# Patient Record
Sex: FEMALE | Race: WHITE
[De-identification: names, ages, dates, MRNs, and addresses within clinical notes are randomized per-mention and may not be internally consistent; named-entity substitution may affect disease eponyms.]

---

## 2017-01-11 ENCOUNTER — HOSPITAL ENCOUNTER (OUTPATIENT)
Dept: HOSPITAL 47 - RADXRMAIN | Age: 82
Discharge: HOME | End: 2017-01-11
Payer: MEDICARE

## 2017-01-11 DIAGNOSIS — R19.09: Primary | ICD-10-CM

## 2017-01-11 PROCEDURE — 74000: CPT

## 2017-01-11 NOTE — XR
EXAMINATION TYPE: XR KUB

 

DATE OF EXAM: 1/11/2017 4:03 PM

 

COMPARISON: NONE

 

HISTORY: 81 year-old female history of stones and right flank pain

 

FINDINGS: 

Lung bases are clear.

 

Supine imaging limited for assessment of free air.

 

Overall nonobstructive bowel gas pattern. There is mild stool burden.

 

There is a subtle 5 mm calcific density in the right mid abdomen.

 

Posterior lumbar fusion hardware and lateral osseous fusion changes are present.

 

 

IMPRESSION: 

1. Subtle 5 mm density projecting in the right mid abdomen, suspected right renal calculus.

2. Nonobstructive bowel gas pattern.

## 2017-02-10 ENCOUNTER — HOSPITAL ENCOUNTER (OUTPATIENT)
Dept: HOSPITAL 47 - RADXRMAIN | Age: 82
Discharge: HOME | End: 2017-02-10
Payer: MEDICARE

## 2017-02-10 DIAGNOSIS — N28.89: Primary | ICD-10-CM

## 2017-02-10 PROCEDURE — 74000: CPT

## 2017-02-10 NOTE — XR
EXAMINATION TYPE: XR abdomen 1V

 

DATE OF EXAM: 2/10/2017 11:35 AM

 

COMPARISON: Renal calculus

 

INDICATION: Post lithotripsy

 

TECHNIQUE: Single view abdomen supine

 

FINDINGS:  

There is a normal bowel gas pattern.

Psoas margins are normal.

No organomegaly is present.

Very faint calcifications may be in the upper midportion of the right kidney. This may be stable from
 the comparison

 

IMPRESSION: 

1. Superior mid right renal calcification, stable

## 2017-02-20 ENCOUNTER — HOSPITAL ENCOUNTER (OUTPATIENT)
Dept: HOSPITAL 47 - RADUSWWP | Age: 82
Discharge: HOME | End: 2017-02-20
Payer: MEDICARE

## 2017-02-20 DIAGNOSIS — N20.0: Primary | ICD-10-CM

## 2017-02-20 DIAGNOSIS — K74.60: ICD-10-CM

## 2017-02-20 DIAGNOSIS — K82.8: ICD-10-CM

## 2017-02-20 PROCEDURE — 76700 US EXAM ABDOM COMPLETE: CPT

## 2017-02-20 NOTE — US
EXAMINATION TYPE: US abdomen complete

 

DATE OF EXAM: 2/20/2017 1:19 PM

 

COMPARISON: Correlation CT 1/23/2015.

 

CLINICAL HISTORY: 81-year-old female R16.1 SPLENOMEGALY; prior renal stones removed.

 

TECHNIQUE: Multiple sonographic images of the abdomen were obtained.

 

 

FINDINGS:

Liver Length:  11.3 cm   

Gallbladder Wall:  0.1 cm   

CBD:  0.3 cm

Spleen:  10.7 cm   

Right Kidney:  9.7 x 5.4 x 5.0 cm 

Left Kidney:  10.5 x 4.4 x 4.0  cm   

 

Pancreas:  No gross abnormality.

Liver: Diffusely coarsened echotexture and nodular contour. No focal lesion seen.

Gallbladder: Gallbladder is mildly hydropic with a small amount of gallbladder sludge. No gallbladder
 wall thickening, shadowing calculi or pericholecystic fluid.

**Evidence for sonographic Goff's sign:  No

CBD:  Within normal limits. 

Spleen:  Normal size.   

Right Kidney: No hydronephrosis. Scattered echogenic foci are present, largest in the midpole measuri
ng 6 mm.

Left Kidney: No hydronephrosis. A couple echogenic foci are present, largest in the upper pole measur
es 2 mm.

Upper IVC: Within normal limits.

Abd Aorta:  Upper abdominal aorta is borderline ectatic at 2.4 cm.

 

 

 

 

IMPRESSION: 

1. Cirrhotic morphology of the liver.

2. Mildly hydropic gallbladder probably relating to fasting state. No ancillary findings of acute cho
lecystitis.

3. Bilateral nephrolithiasis measuring up to 6 mm on the right and 2 mm on the left. No hydronephrosi
s.

4. The spleen is measured at normal size (10.7 cm).

## 2017-06-20 ENCOUNTER — HOSPITAL ENCOUNTER (INPATIENT)
Dept: HOSPITAL 47 - 2ORMAIN | Age: 82
LOS: 3 days | Discharge: NURSING, REHAB OR LONG-TERM CARE | DRG: 470 | End: 2017-06-23
Payer: MEDICARE

## 2017-06-20 VITALS — BODY MASS INDEX: 31.7 KG/M2

## 2017-06-20 DIAGNOSIS — E11.65: ICD-10-CM

## 2017-06-20 DIAGNOSIS — K21.9: ICD-10-CM

## 2017-06-20 DIAGNOSIS — M17.11: Primary | ICD-10-CM

## 2017-06-20 DIAGNOSIS — Z80.0: ICD-10-CM

## 2017-06-20 DIAGNOSIS — D64.9: ICD-10-CM

## 2017-06-20 DIAGNOSIS — R00.1: ICD-10-CM

## 2017-06-20 DIAGNOSIS — G25.81: ICD-10-CM

## 2017-06-20 DIAGNOSIS — Z87.442: ICD-10-CM

## 2017-06-20 DIAGNOSIS — E03.9: ICD-10-CM

## 2017-06-20 DIAGNOSIS — I10: ICD-10-CM

## 2017-06-20 DIAGNOSIS — D69.6: ICD-10-CM

## 2017-06-20 LAB
GLUCOSE BLD-MCNC: 136 MG/DL (ref 75–99)
GLUCOSE BLD-MCNC: 138 MG/DL (ref 75–99)
GLUCOSE BLD-MCNC: 90 MG/DL (ref 75–99)

## 2017-06-20 PROCEDURE — 0SRC0J9 REPLACEMENT OF RIGHT KNEE JOINT WITH SYNTHETIC SUBSTITUTE, CEMENTED, OPEN APPROACH: ICD-10-PCS

## 2017-06-20 PROCEDURE — 80048 BASIC METABOLIC PNL TOTAL CA: CPT

## 2017-06-20 PROCEDURE — 85025 COMPLETE CBC W/AUTO DIFF WBC: CPT

## 2017-06-20 PROCEDURE — 88300 SURGICAL PATH GROSS: CPT

## 2017-06-20 RX ADMIN — HYDROCODONE BITARTRATE AND ACETAMINOPHEN PRN EACH: 5; 325 TABLET ORAL at 20:47

## 2017-06-20 RX ADMIN — HYDROMORPHONE HYDROCHLORIDE PRN MG: 1 INJECTION, SOLUTION INTRAMUSCULAR; INTRAVENOUS; SUBCUTANEOUS at 13:50

## 2017-06-20 RX ADMIN — HYDROMORPHONE HYDROCHLORIDE PRN MG: 1 INJECTION, SOLUTION INTRAMUSCULAR; INTRAVENOUS; SUBCUTANEOUS at 13:43

## 2017-06-20 RX ADMIN — POTASSIUM CHLORIDE SCH MLS: 14.9 INJECTION, SOLUTION INTRAVENOUS at 10:09

## 2017-06-20 RX ADMIN — DOCUSATE SODIUM AND SENNOSIDES SCH EACH: 50; 8.6 TABLET ORAL at 21:44

## 2017-06-20 RX ADMIN — HYDROMORPHONE HYDROCHLORIDE PRN MG: 1 INJECTION, SOLUTION INTRAMUSCULAR; INTRAVENOUS; SUBCUTANEOUS at 13:35

## 2017-06-20 RX ADMIN — ASPIRIN 325 MG ORAL TABLET SCH MG: 325 PILL ORAL at 21:44

## 2017-06-20 RX ADMIN — CEFAZOLIN SCH MLS/HR: 330 INJECTION, POWDER, FOR SOLUTION INTRAMUSCULAR; INTRAVENOUS at 15:24

## 2017-06-20 NOTE — P.ONQ
Anesthesiology Proc Note - PNB





- Peripheral Nerve Block Performed


  ** Right Adductor Canal


Time Out Performed: Yes


Procedure Start Time: 10:40


Indication: Acute Post-Operative Pain


Sedation Type: Sedate with meaningful contact maintained


Preparation: Sterile Prep


Position: Supine


Catheter: Indwelling


Needle Types: On-Q


Needle Size: 100mm (4")


Needle Gauge: 20


Technique: Ultrasound


Injectate: 0.5% Ropivacaine (see comment for volume)


Blood Aspirated: No


Pain Paresthesia on Injection Noted: No


Resistance on Injection: Normal


Events: Uneventful and Well Tolerated

## 2017-06-20 NOTE — P.OP
Date of Procedure: 06/20/17


Preoperative Diagnosis: 


Severe osteoarthritis right knee


Postoperative Diagnosis: 


Severe osteoarthritis right knee


Procedure(s) Performed: 


Right total knee arthroplasty


Implants: 


Smith and Nephew Oxinium femoral component size 5, right


Smith & Nephew Griselda II right nonporous tibial baseplate size 4


Smith & Nephew size 9 mm Legion XLPE dished articular insert, size 3-4


Smith & Nephew Griselda II resurfacing patellar component, 32 mm


All components were cemented using Mena bone cement..


The articulation is ceramic on polyethylene.


Anesthesia: GETA


Surgeon: Isai Thomas


Assistant #1: Daniella Mcgraw


Estimated Blood Loss (ml): 50


Pathology: other (Bone and cartilage)


Condition: stable


Disposition: PACU


Indications for Procedure: 


After failure of conservative treatment we discussed the surgical and 

nonsurgical treatment options at length.  Patient wishes to proceed with a 

total knee arthroplasty.  Complications specific to this procedure were 

discussed at length, including but not limited to infection, bleeding, stiffness

, and nerve injury.  Patient is aware of all these complications and informed 

consent was obtained


Operative Findings: 


The operative findings are consistent with severe osteoarthritis of the right 

knee


Description of Procedure: 


Patient was seen in the preoperative area consent was reviewed and operative 

site was marked with a skin marker.  An adductor canal pain catheter was placed 

by anesthesia in the preoperative area.  Patient was then brought to the 

operating room and given preoperative antibiotics intravenously. A general 

anesthetic was administered by the anesthesia department.  A tourniquet was 

placed on the upper thigh and the lower extremity was prepped and draped in 

usual sterile fashion.  A gram of transexamic acid was given.  A universal 

timeout was then performed which confirmed the patient's name, surgical site, 

ALLERGIES, and consent.





The lower extremity was then exsanguinated and tourniquet was inflated to 250 

mmHg.  A standard and anterior midline approach to the knee was performed.  The 

skin and subcutaneous tissue was dissected down to the patellar tendon.  A 

medial parapatellar arthrotomy was then performed.  The knee was then extended, 

the patellar was everted, and the knee was again flexed.  Anterior horns of 

both menisci were excised, and a release was performed to the posterior medial 

aspect of the knee.  On gross visual inspection, there was complete loss of 

articular cartilage in the medial and patellofemoral joint spaces.  There was 

also significant cartilage damage in the lateral compartment.  There were 

multiple periarticular osteophytes which were then removed with a Ronguer.  The 

femoral canal was then opened with the appropriate drill, and the 

intramedullary femoral cutting guide was then placed and set for 4 of valgus.  

The distal femoral cutting block was then pinned in place, and the distal femur 

was then cut.  The cutting block was then removed and the cut was checked for 

flatness.  Next, the sizing guide was then placed and set for 3 external 

rotation based off of the epicondylar axis and Whitesides line.  After the 

femur was sized, the appropriate 4-in-1 cutting block was then pinned in place.

  The anterior condyles were cut without notching.  The posterior and chamfer 

cuts were performed while protecting the collateral ligaments.  The cutting 

block was then removed, and the femoral canal was plugged with autologous bone.





Attention was then directed to the tibia.  The remaining ACL was removed with a 

Ronguer, and the tibia was then gently subluxed forward with a large bent knee 

retractor.  Any remaining menisci was excised.  The posterior lateral corner 

was cauterized in order to cauterize the lateral geniculate artery.  The extra 

medullary tibial cutting guide was then placed, set for the appropriate rotation

, slope, and depth of resection.  The proximal tibia cutting guide was then 

pinned in place.  Proximal tibia was then cut and sized.  Next trials were then 

placed with the appropriate-sized insert.  The knee was able to fully extend 

and flex to 130 and was stable throughout all range of motion.  The knee was 

then extended, patella everted.  Patella was then measured, and then using an 

osteotomy guide, the patella was cut at the appropriate level.  The patella was 

then measured and drilled and the patella trial was then placed.  The knee was 

then taken through range of motion with the patella trial and the patella 

tracked normally.  The knee was then extended patella trial was then removed 

and the patella was everted.  Knee was then flexed and lug holes were drilled 

through the femoral trial and the femoral trial was then removed.  The tibial 

was then exposed, and the tibial broach guide was then pinned in place after it 

was set for the appropriate rotation to allow for the most coverage without 

overhang.  The tibia was then reamed and broached.





The cut surfaces of bone were then irrigated with pulsatile lavage.  The 

posterior structures were injected with the ropivacaine solution. The 

components were then opened, the cement was mixed, and the components were then 

cemented in place.  The cement was allowed to harden with the knee in full 

extension.  While the cement was hardening, the remaining soft tissues were 

then injected with a ropivacaine solution, which consisted of 246.25 mg of 

ropivacaine, 0.5 mg of epinephrine, 30 mg of Toradol, 80 g of clonidine, and 

48.45 mL of sterile water, for a total of 100 mL of fluid injected. After the 

cemented hardened.  The tourniquet was released, and hemostasis was obtained.  

A second gram of transexamic acid was given.  The knee was again irrigated.  

The knee was again taken through range of motion and found to be stable 

throughout all range of motion of 0-130, and the patella tracked normally.  

The fascia was then closed with #2 strata fix suture.  The subcutaneous tissue 

was closed with 3-0 Vicryl and 3-0 strata fix.  Dermabond tape was used for the 

skin and placed with the knee in flexion. The patient was placed in a sterile 

dressing.  Patient was then transferred to recovery room in stable condition.





The assistant JARRELL Greenwood was required due the complexity surgery and the 

need for a skilled surgical assistant.  She assisted in positioning, draping, 

retraction, and closure of the wound.

## 2017-06-20 NOTE — XR
EXAMINATION TYPE: XR knee limited RT

 

DATE OF EXAM: 6/20/2017

 

CLINICAL HISTORY: Right knee pain and arthritis status post total knee replacement.

 

TECHNIQUE:  Portable AP and crosstable lateral views of the right knee are obtained immediately posto
peratively.

 

COMPARISON: None

 

FINDINGS:  Metallic hardware from total right knee arthroplasty is seen and appears satisfactory in a
lignment and position.  There is evidence of recent surgery.

 

 IMPRESSION:  METALLIC HARDWARE FROM TOTAL right KNEE ARTHROPLASTY IS SATISFACTORY IN ALIGNMENT.

## 2017-06-21 VITALS — RESPIRATION RATE: 16 BRPM

## 2017-06-21 LAB
BASOPHILS # BLD AUTO: 0 K/UL (ref 0–0.2)
BASOPHILS NFR BLD AUTO: 0 %
CH: 29.9
CHCM: 32.2
EOSINOPHIL # BLD AUTO: 0 K/UL (ref 0–0.7)
EOSINOPHIL NFR BLD AUTO: 0 %
ERYTHROCYTE [DISTWIDTH] IN BLOOD BY AUTOMATED COUNT: 3.8 M/UL (ref 3.8–5.4)
ERYTHROCYTE [DISTWIDTH] IN BLOOD: 13.9 % (ref 11.5–15.5)
GLUCOSE BLD-MCNC: 104 MG/DL (ref 75–99)
GLUCOSE BLD-MCNC: 68 MG/DL (ref 75–99)
GLUCOSE BLD-MCNC: 82 MG/DL (ref 75–99)
GLUCOSE BLD-MCNC: 84 MG/DL (ref 75–99)
GLUCOSE BLD-MCNC: 88 MG/DL (ref 75–99)
HCT VFR BLD AUTO: 35.4 % (ref 34–46)
HDW: 2.45
HGB BLD-MCNC: 11.3 GM/DL (ref 11.4–16)
LUC NFR BLD AUTO: 2 %
LYMPHOCYTES # SPEC AUTO: 1 K/UL (ref 1–4.8)
LYMPHOCYTES NFR SPEC AUTO: 19 %
MCH RBC QN AUTO: 29.7 PG (ref 25–35)
MCHC RBC AUTO-ENTMCNC: 31.9 G/DL (ref 31–37)
MCV RBC AUTO: 93.3 FL (ref 80–100)
MONOCYTES # BLD AUTO: 0.4 K/UL (ref 0–1)
MONOCYTES NFR BLD AUTO: 7 %
NEUTROPHILS # BLD AUTO: 3.8 K/UL (ref 1.3–7.7)
NEUTROPHILS NFR BLD AUTO: 72 %
WBC # BLD AUTO: 0.09 10*3/UL
WBC # BLD AUTO: 5.3 K/UL (ref 3.8–10.6)
WBC (PEROX): 5.45

## 2017-06-21 RX ADMIN — ASPIRIN 325 MG ORAL TABLET SCH MG: 325 PILL ORAL at 07:42

## 2017-06-21 RX ADMIN — COLCHICINE SCH: 0.6 TABLET, FILM COATED ORAL at 07:42

## 2017-06-21 RX ADMIN — HYDROCODONE BITARTRATE AND ACETAMINOPHEN PRN EACH: 5; 325 TABLET ORAL at 14:17

## 2017-06-21 RX ADMIN — HYDROCODONE BITARTRATE AND ACETAMINOPHEN PRN EACH: 5; 325 TABLET ORAL at 21:07

## 2017-06-21 RX ADMIN — MELOXICAM SCH MG: 7.5 TABLET ORAL at 07:42

## 2017-06-21 RX ADMIN — POTASSIUM CHLORIDE SCH: 14.9 INJECTION, SOLUTION INTRAVENOUS at 04:49

## 2017-06-21 RX ADMIN — CEFAZOLIN SCH MLS/HR: 330 INJECTION, POWDER, FOR SOLUTION INTRAMUSCULAR; INTRAVENOUS at 04:58

## 2017-06-21 RX ADMIN — HYDROCODONE BITARTRATE AND ACETAMINOPHEN PRN EACH: 5; 325 TABLET ORAL at 09:10

## 2017-06-21 RX ADMIN — PANTOPRAZOLE SODIUM SCH MG: 40 TABLET, DELAYED RELEASE ORAL at 17:41

## 2017-06-21 RX ADMIN — DOCUSATE SODIUM AND SENNOSIDES SCH EACH: 50; 8.6 TABLET ORAL at 21:08

## 2017-06-21 NOTE — P.PN
Progress Note - Text


Patient's aspirin dose was changed from 325mg BID to 325mg once daily. This was 

a recommendation per medicine due to patient's history of GI bleed.

## 2017-06-21 NOTE — P.PN
Subjective


Principal diagnosis: 


Status post total knee arthroplasty





This is a well-appearing 81-year-old female who is status post total right knee 

arthroplasty.  This is postoperative day #1.  Patient states her pain is well 

controlled and she has been up and walking several times today without 

difficulty.  Patient has no new complaints.








Objective





- Vital Signs


Vital signs: 


 Vital Signs











Temp  97.6 F   06/21/17 07:00


 


Pulse  50 L  06/21/17 07:00


 


Resp  16   06/21/17 07:00


 


BP  107/53   06/21/17 07:00


 


Pulse Ox  100   06/21/17 07:00








 Intake & Output











 06/20/17 06/21/17 06/21/17





 18:59 06:59 18:59


 


Intake Total 1601 720 


 


Output Total 450  


 


Balance 1151 720 


 


Intake:   


 


  IV 1601 720 


 


    Sodium Chloride 0.9% 1,  720 





    000 ml @ 60 mls/hr IV .   





    [id number] JOSELO Rx#:[id number]   


 


Output:   


 


  Urine 400  


 


  Estimated Blood Loss 50  


 


Other:   


 


  Voiding Method  Toilet 


 


  # Voids  2 














- Exam


Vital signs are stable.  Patient is in no acute distress and is alert and 

oriented 3.  Calf is soft and nontender.  Incision is clean, dry, and intact.  

Neurovascular status intact.  Patient has full foot and ankle motion.  








- Labs


CBC & Chem 7: 


 06/21/17 06:52





Labs: 


 Abnormal Lab Results - Last 24 Hours (Table)











  06/20/17 06/20/17 06/21/17 Range/Units





  16:42 20:14 06:52 


 


Hgb    11.3 L  (11.4-16.0)  gm/dL


 


Plt Count    116 L  (150-450)  k/uL


 


POC Glucose (mg/dL)  136 H  138 H   (75-99)  mg/dL














Assessment and Plan


(1) Primary osteoarthritis of right knee


Status: Acute   





(2) S/P total knee arthroplasty


Status: Acute   


Plan: 


#1 Continue with routine postoperative care. 


#2 Anticoagulation with aspirin.  


#3 Physical therapy and CPM today. 


#4 Appreciated input from medicine. 


#5 Anticipate discharge to rehab likely on Friday.

## 2017-06-21 NOTE — CONS
DATE OF CONSULTATION:  2017 



REASON FOR CONSULTATION: Advice regarding diabetes mellitus and other 

medical issues, requested by Dr. Thomas. 



HISTORY OF PRESENT ILLNESS: This 81-year-old woman with a past medical 

history of diabetes mellitus, GERD, GI bleed, history of hypertension, 

history of DJD, hypothyroidism, kidney stones, pseudogout, being 

followed by Dr. Cristopher Awan in the outpatient setting, was admitted 

after right total knee arthroplasty. There is no history of any fever, 

rigor, or chills.  No history of any headache, loss of consciousness. 

No history of any hematochezia or melena at this time.  



PAST MEDICAL HISTORY: 

1. Diabetes mellitus, type 2. 

2. History of GERD. 

3. GI bleed. 

4. Hypertension. 

5. History of DJD. 

6. History of hypothyroidism. 

7. Pseudogout. 



HOME MEDICATIONS: 

1. Zanaflex 1 mg p.o. at bedtime p.r.n. 

2. Requip 2 mg p.o. t.i.d. p.r.n. 

3. Altace 10 mg p.o. daily. 

4. Norco 5 mg p.o. q.6 p.r.n. 

5. Thyroid 1 to 1.5 mg p.o. daily. 

6. Colcrys 0.6 mg p.o. daily. 

7. Vitamin D3 2000 daily. 

8. Ecotrin 81 mg p.o. b.i.d. 



ALLERGIES: 

1. PERCOCET. 

2. HYDROCHLOROTHIAZIDE. 

3. LEVAQUIN. 

4. COUMADIN. 

5. SYNTHROID. 



FAMILY HISTORY: History of colon cancer in the family. 



SOCIAL HISTORY: No history of smoking.  No history of alcohol intake.  



REVIEW OF SYSTEMS: 

ENT: No diminished hearing.  No diminished vision. 

CARDIOVASCULAR SYSTEM: No angina, palpitations. 

RESPIRATORY SYSTEM:  No cough, hemoptysis. 

GI: No nausea, vomiting. 

: No dysuria. 

NERVOUS SYSTEM: No numbness or weakness. 

ALLERGY/IMMUNOLOGY: No asthma, hayfever. 

MUSCULOSKELETAL: As mentioned earlier. 

HEMATOLOGY/ONCOLOGY: No history of anemia. 

ENDOCRINE: As mentioned earlier. 

CONSTITUTIONAL:  As mentioned earlier. 

DERMATOLOGY: Negative. 

RHEUMATOLOGY: Negative. 

PSYCHIATRY: As mentioned earlier. 



PHYSICAL EXAMINATION: Patient is alert and oriented x3. Pulse is 50. 

Blood pressure is 107/53, respiration 16, temperature 97.6, pulse ox 

100% on room air.  

HEENT: Conjunctivae normal. Oral mucosa moist. 

NECK: No jugular venous distention. No carotid bruit. No lymph node 

enlargement.  

CARDIOVASCULAR SYSTEM: S1, S2 muffled. 

RESPIRATORY: Breath sounds diminished at the bases. No rhonchi. No 

crackles.  

ABDOMEN: Soft, nontender. No mass palpable. 

LEGS: Status post right knee arthroplasty. 

NERVOUS SYSTEM: Higher functions as mentioned earlier. Moves all 4 

limbs. No focal motor or sensory deficit.  

LYMPHATICS:  No lymph node palpable in neck, axillae or groin.   

SKIN: No ulcer, rash, bleeding. 



LABS: WBC 5.3, hemoglobin 11.3, platelets 116. 



ASSESSMENT: 

1. Status post right total knee arthroplasty. 

2. Transient bradycardia. 

3. Anemia, possibly dilutional. 

4. Thrombocytopenia. 

5. Mild hyperglycemia. 

6. History of diabetes mellitus, type 2. 

7. Gastroesophageal reflux disease. 

8. History of gastrointestinal bleed. 

9. History of hypertension. 

10. History of degenerative joint disease. 

11. History of hypothyroidism. 

12. History of kidney stones. 

13. History of pseudogout. 

14. History of bleeding ulcer after using Coumadin. 

15. History of back surgery. 

16. History of  section. 

17. History of bilateral cataracts. 

18. Motion sickness. 



RECOMMENDATIONS AND DISCUSSION: In this 81-year-old woman who 

presented with multiple complex medical issues, we will monitor the 

patient closely, continue the current medications, continue with 

symptomatic treatment. I would recommend resuming the home 

medications. I would also recommend proton pump inhibitors and closely 

monitor. Other than that, DVT prophylaxis per Orthopedic Surgery. 

Repeat labs will be ordered in the morning. Guarded prognosis because 

of multiple complex medical issues. Further recommendations to follow.

## 2017-06-22 LAB
GLUCOSE BLD-MCNC: 111 MG/DL (ref 75–99)
GLUCOSE BLD-MCNC: 112 MG/DL (ref 75–99)
GLUCOSE BLD-MCNC: 98 MG/DL (ref 75–99)
GLUCOSE BLD-MCNC: 98 MG/DL (ref 75–99)

## 2017-06-22 RX ADMIN — Medication SCH UNIT: at 08:44

## 2017-06-22 RX ADMIN — ASPIRIN 325 MG ORAL TABLET SCH MG: 325 PILL ORAL at 08:43

## 2017-06-22 RX ADMIN — COLCHICINE SCH: 0.6 TABLET, FILM COATED ORAL at 08:44

## 2017-06-22 RX ADMIN — HYDROCODONE BITARTRATE AND ACETAMINOPHEN PRN EACH: 7.5; 325 TABLET ORAL at 21:03

## 2017-06-22 RX ADMIN — HYDROCODONE BITARTRATE AND ACETAMINOPHEN PRN EACH: 5; 325 TABLET ORAL at 03:04

## 2017-06-22 RX ADMIN — PANTOPRAZOLE SODIUM SCH MG: 40 TABLET, DELAYED RELEASE ORAL at 17:42

## 2017-06-22 RX ADMIN — CEFAZOLIN SCH: 330 INJECTION, POWDER, FOR SOLUTION INTRAMUSCULAR; INTRAVENOUS at 15:41

## 2017-06-22 RX ADMIN — LISINOPRIL SCH: 20 TABLET ORAL at 08:44

## 2017-06-22 RX ADMIN — CEFAZOLIN SCH: 330 INJECTION, POWDER, FOR SOLUTION INTRAMUSCULAR; INTRAVENOUS at 01:18

## 2017-06-22 RX ADMIN — MELOXICAM SCH MG: 7.5 TABLET ORAL at 08:43

## 2017-06-22 RX ADMIN — POTASSIUM CHLORIDE SCH: 14.9 INJECTION, SOLUTION INTRAVENOUS at 08:44

## 2017-06-22 RX ADMIN — HYDROCODONE BITARTRATE AND ACETAMINOPHEN PRN EACH: 7.5; 325 TABLET ORAL at 15:39

## 2017-06-22 RX ADMIN — HYDROCODONE BITARTRATE AND ACETAMINOPHEN PRN EACH: 7.5; 325 TABLET ORAL at 08:42

## 2017-06-22 RX ADMIN — PANTOPRAZOLE SODIUM SCH MG: 40 TABLET, DELAYED RELEASE ORAL at 08:43

## 2017-06-22 RX ADMIN — DOCUSATE SODIUM AND SENNOSIDES SCH EACH: 50; 8.6 TABLET ORAL at 20:58

## 2017-06-22 RX ADMIN — MAGNESIUM HYDROXIDE PRN MG: 2400 SUSPENSION ORAL at 17:44

## 2017-06-22 NOTE — PN
I am covering for Dr. Cristopher Awan.



DATE OF SERVICE: 2017



This 81-year-old woman who was admitted with right ankle arthroplasty 

is being closely monitored. Patient complains of pain as well as 

restless leg syndrome. No chest pain. No palpitation. No fever. On 

exam, alert and oriented x3. Pulse 71, blood pressure 108/69, 

respiration 16, temperature 98 degrees, pulse ox 94% on room air.  

HEENT: Conjunctivae normal. 

NECK: No jugular venous distention. 

CARDIOVASCULAR SYSTEM: S1, S2 muffled. 

RESPIRATORY SYSTEM: Breath sounds diminished at the bases. No rhonchi. 

No crackles.    

ABDOMEN: Soft, non-tender. 

LEGS: Status post surgery. 

NERVOUS SYSTEM: No focal deficit.



LABS: Hemoglobin 11.3. Platelets are 116.



ASSESSMENT:  

1. Status post right total knee arthroplasty. 

2. Transient bradycardia, improved. 

3. Mild anemia, possibly dilutional. 

4. Thrombocytopenia, chronic. 

5. Mild hyperglycemia. 

6. History of diabetes mellitus, type 2. 

7. History of gastroesophageal reflux disease. 

8. History of gastrointestinal bleed. 

9. History of hypertension. 

10. History of degenerative joint disease. 

11. History of hypothyroidism. 

12. History of kidney stones. 

13. History of pseudogout. 

14. History of bleeding ulcer after using Coumadin. 

15. History of back surgery. 

16. History of  section. 

17. History of bilateral cataracts. 

18. History of motion sickness. 



RECOMMENDATIONS AND DISCUSSION: I recommend to continue with the 

current medications, continue with the monitoring, symptomatic 

treatment. Otherwise, at this time I recommend DVT prophylaxis, 

incentive spirometry. Continue to monitor. I would also recommend 

repeat labs. Further recommendations to follow.

## 2017-06-22 NOTE — P.PN
Subjective


Principal diagnosis: 


Status post total knee arthroplasty





This is a well-appearing 81-year-old female who is status post total right knee 

arthroplasty.  This is postoperative day #2.  Patient states her pain is 

controlled, but she is experiencing more pain than yesterday. Patient states 

she has been up and walking this morning without any difficulty.  Patient has 

no new complaints.








Objective





- Vital Signs


Vital signs: 


 Vital Signs











Temp  98.2 F   06/22/17 07:00


 


Pulse  67   06/22/17 07:00


 


Resp  16   06/22/17 07:00


 


BP  104/58   06/22/17 07:00


 


Pulse Ox  95   06/22/17 07:00








 Intake & Output











 06/21/17 06/22/17 06/22/17





 18:59 06:59 18:59


 


Intake Total 1080  


 


Output Total 400  


 


Balance 680  


 


Intake:   


 


  Oral 1080  


 


Output:   


 


  Urine 400  


 


Other:   


 


  # Voids  2 1














- Exam


Vital signs are stable.  Patient is in no acute distress and is alert and 

oriented 3.  Calf is soft and nontender.  Incision is clean, dry, and intact.  

Neurovascular status intact.  Patient has full foot and ankle motion.  








- Labs


CBC & Chem 7: 


 06/21/17 06:52





Labs: 


 Abnormal Lab Results - Last 24 Hours (Table)











  06/21/17 06/21/17 Range/Units





  11:21 20:10 


 


POC Glucose (mg/dL)  68 L  104 H  (75-99)  mg/dL














Assessment and Plan


(1) Primary osteoarthritis of right knee


Status: Acute   





(2) S/P total knee arthroplasty


Status: Acute   


Plan: 


#1 Continue with routine postoperative care. 


#2 Anticoagulation with 325mg aspirin once daily, per medicine. 


#3 Physical therapy and CPM today. 


#4 Appreciated input from medicine. 


#5 Anticipate discharge to rehab likely tomorrow.

## 2017-06-23 VITALS — SYSTOLIC BLOOD PRESSURE: 126 MMHG | DIASTOLIC BLOOD PRESSURE: 61 MMHG | TEMPERATURE: 99.1 F | HEART RATE: 85 BPM

## 2017-06-23 LAB
ANION GAP SERPL CALC-SCNC: 7 MMOL/L
BASOPHILS # BLD AUTO: 0 K/UL (ref 0–0.2)
BASOPHILS NFR BLD AUTO: 0 %
BUN SERPL-SCNC: 13 MG/DL (ref 7–17)
CALCIUM SPEC-MCNC: 8.9 MG/DL (ref 8.4–10.2)
CH: 30.1
CHCM: 33.2
CHLORIDE SERPL-SCNC: 103 MMOL/L (ref 98–107)
CO2 SERPL-SCNC: 27 MMOL/L (ref 22–30)
EOSINOPHIL # BLD AUTO: 0.1 K/UL (ref 0–0.7)
EOSINOPHIL NFR BLD AUTO: 2 %
ERYTHROCYTE [DISTWIDTH] IN BLOOD BY AUTOMATED COUNT: 3.54 M/UL (ref 3.8–5.4)
ERYTHROCYTE [DISTWIDTH] IN BLOOD: 13.8 % (ref 11.5–15.5)
GLUCOSE BLD-MCNC: 76 MG/DL (ref 75–99)
GLUCOSE BLD-MCNC: 84 MG/DL (ref 75–99)
GLUCOSE SERPL-MCNC: 107 MG/DL (ref 74–99)
HCT VFR BLD AUTO: 32.2 % (ref 34–46)
HDW: 2.39
HGB BLD-MCNC: 10.6 GM/DL (ref 11.4–16)
LUC NFR BLD AUTO: 2 %
LYMPHOCYTES # SPEC AUTO: 0.6 K/UL (ref 1–4.8)
LYMPHOCYTES NFR SPEC AUTO: 13 %
MCH RBC QN AUTO: 29.8 PG (ref 25–35)
MCHC RBC AUTO-ENTMCNC: 32.8 G/DL (ref 31–37)
MCV RBC AUTO: 91 FL (ref 80–100)
MONOCYTES # BLD AUTO: 0.4 K/UL (ref 0–1)
MONOCYTES NFR BLD AUTO: 7 %
NEUTROPHILS # BLD AUTO: 3.8 K/UL (ref 1.3–7.7)
NEUTROPHILS NFR BLD AUTO: 76 %
NON-AFRICAN AMERICAN GFR(MDRD): >60
POTASSIUM SERPL-SCNC: 4 MMOL/L (ref 3.5–5.1)
SODIUM SERPL-SCNC: 137 MMOL/L (ref 137–145)
WBC # BLD AUTO: 0.09 10*3/UL
WBC # BLD AUTO: 4.9 K/UL (ref 3.8–10.6)
WBC (PEROX): 5.16

## 2017-06-23 RX ADMIN — Medication SCH UNIT: at 07:38

## 2017-06-23 RX ADMIN — POTASSIUM CHLORIDE SCH: 14.9 INJECTION, SOLUTION INTRAVENOUS at 07:37

## 2017-06-23 RX ADMIN — HYDROCODONE BITARTRATE AND ACETAMINOPHEN PRN EACH: 7.5; 325 TABLET ORAL at 03:32

## 2017-06-23 RX ADMIN — HYDROCODONE BITARTRATE AND ACETAMINOPHEN PRN EACH: 7.5; 325 TABLET ORAL at 09:17

## 2017-06-23 RX ADMIN — LISINOPRIL SCH: 20 TABLET ORAL at 07:38

## 2017-06-23 RX ADMIN — MELOXICAM SCH MG: 7.5 TABLET ORAL at 07:37

## 2017-06-23 RX ADMIN — MAGNESIUM HYDROXIDE PRN MG: 2400 SUSPENSION ORAL at 07:41

## 2017-06-23 RX ADMIN — COLCHICINE SCH: 0.6 TABLET, FILM COATED ORAL at 07:38

## 2017-06-23 RX ADMIN — HYDROCODONE BITARTRATE AND ACETAMINOPHEN PRN EACH: 7.5; 325 TABLET ORAL at 13:57

## 2017-06-23 RX ADMIN — PANTOPRAZOLE SODIUM SCH MG: 40 TABLET, DELAYED RELEASE ORAL at 07:37

## 2017-06-23 RX ADMIN — ASPIRIN 325 MG ORAL TABLET SCH MG: 325 PILL ORAL at 07:37

## 2017-06-23 RX ADMIN — CEFAZOLIN SCH: 330 INJECTION, POWDER, FOR SOLUTION INTRAMUSCULAR; INTRAVENOUS at 07:37

## 2017-06-23 NOTE — PN
DATE OF SERVICE: 2017 



This 81-year-old woman who was admitted after right total knee 

arthroplasty, has improved significantly. No chest pain. No 

palpitation. No fever. The patient had thrombocytopenia, which is 

chronic in nature.  



On exam, alert and oriented x3. Pulse is 85, blood pressure 136/67, 

respirations 16, temperature 98.1, pulse ox 94% on room air.  

HEENT:  Conjunctivae normal.   

NECK:  No jugular venous distention. 

CARDIOVASCULAR: S1 and S2, muffled. 

RESPIRATORY:  Breath sounds diminished at the bases.  No rhonchi, no 

crackles.  

ABDOMEN:  Soft, nontender. No mass palpable. 

LEGS: Status post arthroplasty. 

NERVOUS SYSTEM:  Higher function as mentioned. Moves all four limbs. 

No focal motor deficits.   

LYMPHATIC: No lymphadenopathy in the neck, axillae or groin. 

SKIN: No ulcer, rash or bleeding. 

 

LABS: WBC 5, hemoglobin 10.6, platelets 75. 



ASSESSMENT: 

1. Status post right total knee arthroplasty. 

2. Anemia, possibly dilutional.

3. Transient bradycardia, improved. 

4. 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 

5. Mildly hyperglycemia. 

6. History of diabetes type 2. 

7. History of gastroesophageal reflux disease. 

8. History of gastrointestinal bleed. 

9. History of hypertension. 

10. History of degenerative joint disease.

11. History of hypothyroidism. 

12. History of kidney stones.

13. History of pseudogout. 

14. History of bleeding after using Coumadin.

15. History of back surgery.

16. History of  section. 

17. History of bilateral cataracts. 

18. History of motion sickness. 



RECOMMENDATIONS AND DISCUSSION: I recommend to continue the current 

medications, continue monitoring and symptomatic treatment.  

Otherwise, at this time I recommend to monitor the platelets which is 

rather chronic in nature. Otherwise also continue with the Protonix 40 

b.i.d. and rest of the recommendations per Orthopedic Surgery. Further 

recommendations to follow. Follow with Dr. Awan in the outpatient 

setting. Discussed with the patient who understands.

## 2017-06-23 NOTE — P.DS
Providers


Date of admission: 


06/20/17 09:40





Expected date of discharge: 06/23/17


Attending physician: 


Isai Thomas





Consults: 





 





06/20/17 13:04


Consult Physician Routine 


   Consulting Provider: Jamaica Sierra


   Consult Reason/Comments: medical management


   Do you want consulting provider notified?: Yes











Primary care physician: 


Cristopher MIJARES Lv








- Discharge Diagnosis(es)


(1) Primary osteoarthritis of right knee


Current Visit: Yes   Status: Acute   





(2) S/P total knee arthroplasty


Current Visit: Yes   Status: Acute   


Hospital Course: 


This is a 81-year-old female with known history of degenerative arthritis of 

the right knee.  The patient presents for evaluation.  After discussion and 

consideration patient elects to proceed with total knee arthroplasty.  The 

patient is seen preoperatively by Dr. Thomas and cleared for surgery.





Patient is admitted to [facility] on 06/20/2017 for total knee 

arthroplasty.  The procedures performed without complication or sequelae.  The 

patient is doing well postoperatively.  Labs and vital signs are stable on day 

of discharge.





On day of discharge patient's knee incision is healing well.  There is minimal 

erythema.  There is no drainage noted at this time.  There is minimal soft 

tissue swelling to the knee.  Patient has full foot and ankle motion without 

difficulty or pain.  Neurovascular status to the right lower extremity is 

intact.  Patient is discharged to rehab in good condition. Please see med rec 

for accurate list of home medications.








Plan - Discharge Summary


New Discharge Prescriptions: 


New


   Aspirin 325 mg PO DAILY #30 tab


   HYDROcodone/APAP 7.5-325MG [Norco 7.5-325] 1 - 2 tab PO Q4-6H PRN #90 tab


     PRN Reason: Pain


   Sennosides-Docusate Sodium [Senokot-S] 1 tab PO BID #60 tablet





No Action


   Cholecalciferol [Vitamin D3] 2,000 unit PO DAILY


   rOPINIRole HCL [Requip] 2 mg PO TID PRN


     PRN Reason: restless leg


   Hydrocodone/Acetaminophen [Norco 5-325] 0.5 tab PO Q6HR PRN


     PRN Reason: Mild Pain


   tiZANidine [Zanaflex] 1 mg PO HS PRN


     PRN Reason: MUSCLE RELAXER


   G T A Supplement (For Thyroid) 1 - 1.5 tab PO DAILY


   Ramipril [Altace] 10 mg PO DAILY


   Colchicine [Colcrys] 0.6 mg PO DAILY


   Aspirin [Adult Low Dose Aspirin EC] 81 mg PO BID


Discharge Medication List





Cholecalciferol [Vitamin D3] 2,000 unit PO DAILY 02/09/15 [History]


Hydrocodone/Acetaminophen [Norco 5-325] 0.5 tab PO Q6HR PRN 02/09/15 [History]


rOPINIRole HCL [Requip] 2 mg PO TID PRN 02/09/15 [History]


G T A Supplement (For Thyroid) 1 - 1.5 tab PO DAILY 08/07/15 [History]


tiZANidine [Zanaflex] 1 mg PO HS PRN 08/07/15 [History]


Aspirin [Adult Low Dose Aspirin EC] 81 mg PO BID 06/15/17 [History]


Colchicine [Colcrys] 0.6 mg PO DAILY 06/15/17 [History]


Ramipril [Altace] 10 mg PO DAILY 06/15/17 [History]


Aspirin 325 mg PO DAILY #30 tab 06/22/17 [Rx]


HYDROcodone/APAP 7.5-325MG [Norco 7.5-325] 1 - 2 tab PO Q4-6H PRN #90 tab 06/22/ 17 [Rx]


Sennosides-Docusate Sodium [Senokot-S] 1 tab PO BID #60 tablet 06/22/17 [Rx]








Follow up Appointment(s)/Referral(s): 


Isai Thomas DO [Doctor of Osteopathic Medicine] - 2 Weeks


Ambulatory/Diagnostic Orders: 


Continuous Passive Motion (CPM) Machine [DME.AMB1] Time Frame: 2 Weeks, Location

: Determined By Patient


Activity/Diet/Wound Care/Special Instructions: 


Weightbearing as tolerated with a walker


CPM 5-6h daily


Daily dressing changes, keep incision clean and dry


May shower if no drainage from incision


[facility] with questions or concerns [phone]


Discharge Disposition: TRANSFER TO SNF/ECF

## 2018-03-11 ENCOUNTER — HOSPITAL ENCOUNTER (EMERGENCY)
Dept: HOSPITAL 47 - EC | Age: 83
Discharge: HOME | End: 2018-03-11
Payer: MEDICARE

## 2018-03-11 VITALS — HEART RATE: 71 BPM | SYSTOLIC BLOOD PRESSURE: 153 MMHG | DIASTOLIC BLOOD PRESSURE: 68 MMHG | TEMPERATURE: 97.5 F

## 2018-03-11 VITALS — RESPIRATION RATE: 18 BRPM

## 2018-03-11 DIAGNOSIS — Z88.1: ICD-10-CM

## 2018-03-11 DIAGNOSIS — M79.605: Primary | ICD-10-CM

## 2018-03-11 DIAGNOSIS — Z88.8: ICD-10-CM

## 2018-03-11 DIAGNOSIS — M11.20: ICD-10-CM

## 2018-03-11 DIAGNOSIS — M71.22: ICD-10-CM

## 2018-03-11 DIAGNOSIS — E07.9: ICD-10-CM

## 2018-03-11 DIAGNOSIS — Z79.899: ICD-10-CM

## 2018-03-11 PROCEDURE — 99283 EMERGENCY DEPT VISIT LOW MDM: CPT

## 2018-03-11 NOTE — ED
General Adult HPI





- General


Chief complaint: Extremity Problem,Nontraumatic


Stated complaint: Possible blood clot in leg


Time Seen by Provider: 18 12:17


Source: patient, RN notes reviewed


Mode of arrival: wheelchair


Limitations: no limitations





- History of Present Illness


Initial comments: 





83 yo female presents to the ER with cc of left leg pain. Patient states he has 

had this for the last 4 days. She states that she's had this pain for the last 

4 days.  Patient states it feels deep into her calf.  She was concerned that 

she may have a blood clot.  She states she is able to ambulate.  Hurts when she 

moves her ankle.  She denies any falls traumas or injuries.  She has had in the 

replace in the past.  She was concerned because of her continued pain so she 

thought that she should come in to make sure she did not blood clot.  She 

denies any other symptoms at this time.  She states she sees Dr. Thomas as 

well as Dr. Albarado.  She just states that it's causing her some discomfort so 

she thought that she should be seen.  She denies any other symptoms at this 

time.Patient denies any recent fever, chills, shortness of breath, chest pain, 

back pain, abdominal pain, nausea vomiting, numbness or tingling, dysuria or 

hematuria, constipation or diarrhea, headaches or visual changes, or any other 

current symptoms.





- Related Data


 Home Medications











 Medication  Instructions  Recorded  Confirmed


 


Cholecalciferol [Vitamin D3] 2,000 unit PO DAILY 02/09/15 06/20/17


 


rOPINIRole HCL [Requip] 2 mg PO TID PRN 02/09/15 06/20/17


 


G T A Supplement (For Thyroid) 1 - 1.5 tab PO DAILY 08/07/15 06/20/17


 


tiZANidine [Zanaflex] 1 mg PO HS PRN 08/07/15 06/20/17


 


Colchicine [Colcrys] 0.6 mg PO DAILY 06/15/17 06/20/17


 


Ramipril [Altace] 10 mg PO DAILY 06/15/17 06/20/17








 Previous Rx's











 Medication  Instructions  Recorded


 


Aspirin 325 mg PO DAILY #30 tab 17


 


HYDROcodone/APAP 7.5-325MG [Norco 1 - 2 tab PO Q4-6H PRN #90 tab 17





7.5-325]  


 


Sennosides-Docusate Sodium 1 tab PO BID #60 tablet 17





[Senokot-S]  


 


INSULIN LISPRO (HumaLOG) [humaLOG] 0 unit SQ ACHS #1 vial 17


 


Pantoprazole [Protonix] 40 mg PO AC-BID  tab 17











 Allergies











Allergy/AdvReac Type Severity Reaction Status Date / Time


 


acetaminophen [From Percocet] Allergy  Vomiting Verified 18 11:12


 


hydrochlorothiazide Allergy  Nausea & Verified 18 11:12





   Vomiting  


 


levofloxacin [From Levaquin] Allergy  n/v, Verified 18 11:12





   severe  





   muscle pain  


 


oxycodone HCl [From Percocet] Allergy  Vomiting Verified 18 11:12


 


warfarin sodium Allergy  GI BLEED Verified 18 11:12





[From Coumadin]     


 


levothyroxine sodium AdvReac  N/V, Verified 18 11:12





[From Synthroid]   SEVERE  





   WEAKNESS  














Review of Systems


ROS Statement: 


Those systems with pertinent positive or pertinent negative responses have been 

documented in the HPI.





ROS Other: All systems not noted in ROS Statement are negative.





Past Medical History


Past Medical History: Diabetes Mellitus, GI Bleed, Musculoskeletal Disorder, 

Osteoarthritis (OA), Thyroid Disorder, Vascular Disorder


Additional Past Medical History / Comment(s): kidney stones.  PSEUDO GOUT.  

PEPTIC ULCERS.  RLS.  VARICOSE VEINS


History of Any Multi-Drug Resistant Organisms: None Reported


Past Surgical History:  Section, Hysterectomy, Orthopedic Surgery, 

Tonsillectomy


Additional Past Surgical History / Comment(s): COLONOSCOPY.  EGD.  BILAT 

CATARACTS REMOVED.  D &C.  LT TKA.  BILAT CTR.  PARTIAL PARATHYROID REMOVED.  

LITHOTRIPSY


Past Anesthesia/Blood Transfusion Reactions: Motion Sickness


Past Psychological History: No Psychological Hx Reported


Smoking Status: Never smoker


Past Alcohol Use History: None Reported


Past Drug Use History: None Reported





- Past Family History


  ** Father


Family Medical History: Cancer


Additional Family Medical History / Comment(s): colon





General Exam





- General Exam Comments


Initial Comments: 





General:  The patient is awake and alert, in no distress, and does not appear 

acutely ill.   


Neck:  The neck is supple, there is no tenderness.


Cardiovascular:  There is a regular rate and rhythm. No murmur, rub or gallop 

is appreciated.


Respiratory:  Lungs are clear to auscultation, respirations are non-labored, 

breath sounds are equal.  No wheezes, stridor, rales, or rhonchi.


Musculoskeletal: Sensation intact with 2+ pulses throughout the left flexion.  

Frontal motion of left knee and left ankle.  Patient has no tenderness to 

patient left calf.  No increased swelling no redness.  Patient is able to 

ambulate and bear weight.


Neurological:  CN II-XII intact, There are no obvious motor or sensory 

deficits. Coordination appears grossly intact. Speech is normal.


Skin:  Skin is warm and dry and no rashes or lesions are noted. 


Psychiatric:  Normal mood and affect.  


Limitations: no limitations





Course


 Vital Signs











  18





  11:07


 


Temperature 96.9 F L


 


Pulse Rate 72


 


Respiratory 18





Rate 


 


Blood Pressure 147/77


 


O2 Sat by Pulse 97





Oximetry 














Medical Decision Making





- Medical Decision Making





82-year-old female presents for left leg pain.  This time ultrasound is 

negative for blood clot.  There does appear to be assessed.  At this time we 

did discuss this could be causing her discomfort.  We did discussion follow-up 

with Dr. Thomas her ortho doctor.  We also discussed follow-up with Dr. Albarado her vascular doctor as well.  We did discuss return parameters all 

questions.  Patient stated that she understood and she is given this plan.  All 

questions have been answered.  She'll be discharged.





- Radiology Data


Radiology results: report reviewed, image reviewed





Disposition


Clinical Impression: 


 Pain of left calf, Bakers cyst





Narrative: 





left knee





Disposition: HOME SELF-CARE


Condition: Stable


Instructions:  Bakers Cyst (ED)


Additional Instructions: 


Please use medication as discussed. Please follow up with family doctor if 

symptoms have not improved over the next two days. Please return to the 

emergency room if your symptoms increase or worsen or for any other concerns. 


Referrals: 


Cristopher Awan MD [Primary Care Provider] - 1-2 days


Isai Thomas DO [Doctor of Osteopathic Medicine] - 1-2 days


Time of Disposition: 12:39

## 2018-03-11 NOTE — US
EXAMINATION TYPE: US venous doppler duplex LE LT

 

DATE OF EXAM: 3/11/2018 11:43 AM

 

COMPARISON: None

 

CLINICAL HISTORY: Pain. Left knee replacement x 2011.  No blood thinners.  No hx of DVT.  Pain.  No s
welling or redness. 

 

SIDE PERFORMED: Left  

 

TECHNIQUE:  The lower extremity deep venous system is examined utilizing real time linear array sonog
michoacano with graded compression, doppler sonography and color-flow sonography.

 

VESSELS IMAGED:

External Iliac Vein (EIV)

Common Femoral Vein

Deep Femoral Vein

Greater Saphenous Vein *

Femoral Vein

Popliteal Vein

Small Saphenous Vein *

Proximal Calf Veins

(* superficial vessels)

 

Grayscale, color doppler, spectral doppler imaging performed of the deep veins of the lower extremiti
es.  There is normal flow, compressibility, vascular waveforms.

 

Left Leg:  Negative for DVT.  Cystic appearing lesion in posterior left knee - 6.9 x 3.1 x 2.4 cm, no
nvascular with internal echoes seen inferiorly 

 

 

 

IMPRESSION:  No sonographic evidence of deep venous thrombosis within the left lower extremity. Incid
entally noted cystic popliteal fossa mass, likely Baker's cyst measuring 6.9 cm.

## 2018-05-14 ENCOUNTER — HOSPITAL ENCOUNTER (OUTPATIENT)
Dept: HOSPITAL 47 - RADXRMAIN | Age: 83
Discharge: HOME | End: 2018-05-14
Payer: MEDICARE

## 2018-05-14 DIAGNOSIS — N20.0: Primary | ICD-10-CM

## 2018-05-14 PROCEDURE — 74018 RADEX ABDOMEN 1 VIEW: CPT

## 2018-05-14 NOTE — XR
Abdomen

 

HISTORY: Left flank pain, stone

 

Frontal view of the abdomen submitted and correlated to prior exam 1/11/2017, 2/10/2017

 

punctate calcification seen over the midpole the right kidney as on prior exam. Possible upper pole p
unctate calcification also noted. There is overlying bowel gas which obscures detail. Postop change t
he lumbosacral junction is again noted. No evident bowel obstruction or pneumoperitoneum. Lung bases 
are clear.

 

IMPRESSION: Right-sided nephrolithiasis.

## 2018-05-16 ENCOUNTER — HOSPITAL ENCOUNTER (OUTPATIENT)
Dept: HOSPITAL 47 - RADUSWWP | Age: 83
Discharge: HOME | End: 2018-05-16
Attending: FAMILY MEDICINE
Payer: MEDICARE

## 2018-05-16 DIAGNOSIS — K76.89: Primary | ICD-10-CM

## 2018-05-16 PROCEDURE — 76700 US EXAM ABDOM COMPLETE: CPT

## 2018-05-16 NOTE — US
EXAMINATION TYPE: US abdomen complete

 

DATE OF EXAM: 5/16/2018

 

COMPARISON: NONE

 

CLINICAL HISTORY: 82-year-old female K74.60 CIRRHOSIS OF LIVER. History of kidney stones.

 

TECHNIQUE: Multiple sonographic images of the abdomen are obtained.

 

FINDINGS:

Sonographer notes:**Technical limitations due to large amount of overlying bowel content 

 

EXAM MEASUREMENTS:

 

Liver Length:  12.2 cm   

Gallbladder Wall:  0.3 cm   

CBD:  0.6 cm

Spleen:  11.9 cm   

Right Kidney:  9.0 x 3.8 x 4.4 cm 

Left Kidney:  9.8 x 5.0 x 4.1 cm   

 

 

Pancreas:  Tail obscured by overlying bowel gas. Visualized portions show no gross adenopathy.

Liver:  heterogeneous with slight nodular contour. No focal lesion is seen. 

Gallbladder:  Mildly hydropic with a small amount of sludge. No shadowing calculi or pericholecystic 
fluid.

**Evidence for sonographic Goff's sign:  no

CBD:  wnl given patient's age. 

Spleen:  wnl   

Right Kidney:  The sonographer comments on dense echogenic areas with no shadowing. This is not as we
ll appreciated on the provided images. No hydronephrosis.   

Left Kidney:  visualized portions show no evidence of hydronephrosis 

Upper IVC:  wnl  

Abd Aorta:  proximal is mildly ectatic at 2.6 cm.

 

 

 

IMPRESSION: 

1. Heterogeneous liver with nodular contour in keeping with the patient's cirrhosis. No sonographic e
vidence for hepatoma.

2. Mildly hydropic gallbladder likely due to fasting state. There is a small amount of gallbladder sl
udge. No ancillary findings of acute cholecystitis.

## 2018-05-24 ENCOUNTER — HOSPITAL ENCOUNTER (OUTPATIENT)
Dept: HOSPITAL 47 - RADNMMAIN | Age: 83
Discharge: HOME | End: 2018-05-24
Attending: FAMILY MEDICINE
Payer: MEDICARE

## 2018-05-24 DIAGNOSIS — K80.50: Primary | ICD-10-CM

## 2018-05-24 PROCEDURE — 78226 HEPATOBILIARY SYSTEM IMAGING: CPT

## 2018-05-24 NOTE — NM
EXAMINATION TYPE: NM hepatobiliary w EF

 

DATE OF EXAM: 5/24/2018

 

COMPARISON: NONE

 

HISTORY: Pain

 

TECHNIQUE: After the intravenous administration of 4.7 mCi Tc 99m Mebrofenin hepatobiliary scintigrap
hy is performed.  Immediate images post injection.

 

FINDINGS: 

There is satisfactory initial accumulation of tracer by the liver.  The gallbladder is visualized wit
hin 30 minutes.  The small bowel activity is noted within 30 minutes.  At one hour 8 ounces of oral e
nsure plus is given to mimic CCK and gallbladder ejection fraction is calculated at 72 %, in the norm
al range.  Therefore there is no scintigraphic evidence of cystic or common bile duct obstruction to 
suggest acute cholecystitis or gallbladder dyskinesia. 

 

IMPRESSION: Exam is within normal limits.

## 2018-10-17 ENCOUNTER — HOSPITAL ENCOUNTER (OUTPATIENT)
Dept: HOSPITAL 47 - RADXRMAIN | Age: 83
Discharge: HOME | End: 2018-10-17
Attending: PHYSICIAN ASSISTANT
Payer: MEDICARE

## 2018-10-17 DIAGNOSIS — M17.12: Primary | ICD-10-CM

## 2019-02-14 ENCOUNTER — HOSPITAL ENCOUNTER (OUTPATIENT)
Dept: HOSPITAL 47 - RADBDWWP | Age: 84
Discharge: HOME | End: 2019-02-14
Attending: FAMILY MEDICINE
Payer: MEDICARE

## 2019-02-14 DIAGNOSIS — Z78.0: ICD-10-CM

## 2019-02-14 DIAGNOSIS — M81.0: Primary | ICD-10-CM

## 2019-02-14 PROCEDURE — 77080 DXA BONE DENSITY AXIAL: CPT

## 2019-02-14 NOTE — BD
EXAMINATION TYPE: Axial Bone Density

 

DATE OF EXAM: 2/14/2019

 

COMPARISON: 09.30.2014

 

CLINICAL HISTORY: 83 YR OLD FEMALE..ICD-10 CODE:    Z49.81 ASYMPTOMATIC

 

Height:  59.8

Weight:  166

 

FRAX RISK QUESTIONS:

NOTHING TO NOTE HERE

 

 

RISK FACTORS 

HISTORY OF: 

Surgery to Spine....SURGICAL FUSION OF L/S SPINE

Family History of Osteoporosis: YES HER MOTHER, NO FX THOUGH

Diet low in dairy products/other sources of calcium:  MAYBE A BIT LOW

Postmenopausal woman: YES, AT 57 YRS OLD

Lost more than 2 inches in height since high school: YES

Frequent falls: ELDERLY

 

MEDICATIONS: 

Thyroid Medications:  YES, CANDI THYROID 10 YRS

Additional Medications: DIET CONTROLLED DIABETIC, VIT D,   

Additional History: DIABETIC, THYROID SURG, BILAT TKRS, 

 

 

EXAM MEASUREMENTS: 

Bone mineral densitometry was performed using the [employer] System.

L/S SPINE NOT SCANNED

 

Bone mineral density about the R hip (g/cm2): 0.688

Bone mineral density about the L hip (g/cm2):  0.606

T Score values are as follows:

-----R Neck: -2.7

-----L Neck: -3.2

-----R Total: -2.5

-----L Total: -3.2

Bone mineral density has: Increased 0.6% since study of: 09.30.2014

 

FRAX%s:    THERE IS A 25.8% CHANCE FOR A MAJOR OSTEOPOROTIC FX AND A 11.2% FOR HIP.....PROBABILITY OF
 FX IN 10 YRS TIME

 

Bone mineral density about the L Wrist (g/cm2): 0.281

T Score values are as follows: 

-----Dist. R+U: -5.2

-----Prox. R+U: -5.4

-----Radius total: -6.4

Bone mineral density      FIRST BONE DENSITY SCAN OF FOREARM

 

IMPRESSION:

Osteoporosis (T Score less than -2.5).

 

There is increased fracture risk and therapy is usually indicated based on age.

 

Re-Screen 1-2 years.

 

 

 

 

 

NOTE:  T-SCORE=SD OF THE YOUNG ADULT MEAN.

## 2019-02-27 ENCOUNTER — HOSPITAL ENCOUNTER (OUTPATIENT)
Dept: HOSPITAL 47 - ORWHC2ENDO | Age: 84
Discharge: HOME | End: 2019-02-27
Attending: INTERNAL MEDICINE
Payer: MEDICARE

## 2019-02-27 VITALS — RESPIRATION RATE: 16 BRPM

## 2019-02-27 VITALS — SYSTOLIC BLOOD PRESSURE: 102 MMHG | HEART RATE: 55 BPM | DIASTOLIC BLOOD PRESSURE: 59 MMHG

## 2019-02-27 VITALS — TEMPERATURE: 97.4 F

## 2019-02-27 VITALS — BODY MASS INDEX: 31.2 KG/M2

## 2019-02-27 DIAGNOSIS — Z86.010: ICD-10-CM

## 2019-02-27 DIAGNOSIS — Z12.11: Primary | ICD-10-CM

## 2019-02-27 DIAGNOSIS — K63.5: ICD-10-CM

## 2019-02-27 DIAGNOSIS — Z79.82: ICD-10-CM

## 2019-02-27 DIAGNOSIS — Z88.8: ICD-10-CM

## 2019-02-27 DIAGNOSIS — Z79.899: ICD-10-CM

## 2019-02-27 DIAGNOSIS — Z88.5: ICD-10-CM

## 2019-02-27 DIAGNOSIS — Z80.0: ICD-10-CM

## 2019-02-27 LAB — GLUCOSE BLD-MCNC: 78 MG/DL (ref 75–99)

## 2019-02-27 PROCEDURE — 45385 COLONOSCOPY W/LESION REMOVAL: CPT

## 2019-02-27 PROCEDURE — 88305 TISSUE EXAM BY PATHOLOGIST: CPT

## 2019-02-27 PROCEDURE — 45384 COLONOSCOPY W/LESION REMOVAL: CPT

## 2019-06-06 ENCOUNTER — HOSPITAL ENCOUNTER (OUTPATIENT)
Dept: HOSPITAL 47 - RADMAMWWP | Age: 84
Discharge: HOME | End: 2019-06-06
Attending: FAMILY MEDICINE
Payer: MEDICARE

## 2019-06-06 DIAGNOSIS — Z12.31: Primary | ICD-10-CM

## 2019-06-06 PROCEDURE — 77067 SCR MAMMO BI INCL CAD: CPT

## 2019-06-06 PROCEDURE — 77063 BREAST TOMOSYNTHESIS BI: CPT

## 2019-06-07 NOTE — MM
Reason for exam: screening  (asymptomatic).

Last mammogram was performed 3 years and 4 months ago.



History:

Patient is postmenopausal.



Physical Findings:

A clinical breast exam by your physician is recommended on an annual basis and 

results should be correlated with mammographic findings.



MG 3D Screening Mammo W/Cad

Bilateral CC and MLO view(s) were taken.

Prior study comparison: January 28, 2016, bilateral MG screening mammo w CAD.  

September 30, 2014, bilateral MG screening mammo w CAD.

The breast tissue is heterogeneously dense. This may lower the sensitivity of 

mammography.  Benign appearing calcifictions in the left breast. No suspicious 

abnormality.  No significant changes when compared with prior studies.





ASSESSMENT: Benign, BI-RAD 2



RECOMMENDATION:

Routine screening mammogram of both breasts in 1 year.

Manage on a clinical basis with regard to intermittent right breast pain, if focal

and persistent, diagnostic exam recommended.

## 2019-09-13 ENCOUNTER — HOSPITAL ENCOUNTER (OUTPATIENT)
Dept: HOSPITAL 47 - RADBDWWP | Age: 84
Discharge: HOME | End: 2019-09-13
Attending: INTERNAL MEDICINE
Payer: MEDICARE

## 2019-09-13 DIAGNOSIS — Z53.9: Primary | ICD-10-CM

## 2021-01-14 ENCOUNTER — HOSPITAL ENCOUNTER (OUTPATIENT)
Dept: HOSPITAL 47 - RADECHMAIN | Age: 86
Discharge: HOME | End: 2021-01-14
Attending: FAMILY MEDICINE
Payer: MEDICARE

## 2021-01-14 DIAGNOSIS — I08.1: Primary | ICD-10-CM

## 2021-01-14 PROCEDURE — 93306 TTE W/DOPPLER COMPLETE: CPT

## 2021-01-15 NOTE — ECHOF
Referral Reason:[id number]



MEASUREMENTS

--------

HEIGHT: 132.1 cm

WEIGHT: 86.2 kg

BP: 

RVIDd:   3.4 cm     (< 3.3)

IVSd:   1.2 cm     (0.6 - 1.1)

LVIDd:   4.1 cm     (3.9 - 5.3)

LVPWd:   1.7 cm     (0.6 - 1.1)

IVSs:   1.4 cm

LVIDs:   2.9 cm

LVPWs:   1.1 cm

LAESV Index (A-L):   37.84 ml/m

Ao Diam:   2.9 cm     (2.0 - 3.7)

AV Cusp:   1.5 cm     (1.5 - 2.6)

MV EXCURSION:   10.152 mm     (> 18.000)

MV EF SLOPE:   59 mm/s     (70 - 150)

MV E Juan:   0.46 m/s

MV DecT:   294 ms

MV A Juan:   0.76 m/s

MV E/A Ratio:   0.60 

RAP:   5.00 mmHg

RVSP:   25.69 mmHg







FINDINGS

--------

Sinus rhythm.

This was a technically adequate study.

The left ventricular size is normal.   There is mild concentric left ventricular hypertrophy.   Overa
ll left ventricular systolic function is low-normal with, an EF between 50 - 55 %.   Inferior basal H
ypokinesis

The right ventricle is normal in size.

LA is moderately dilated 34-39 ml/m2

The right atrial size is normal.

There is mild aortic valve sclerosis.   There is no evidence of aortic regurgitation.

Mild mitral regurgitation is present.

Mild tricuspid regurgitation present.   Right ventricular systolic pressure is normal at < 35 mmHg.

Trace/mild (physiologic)  pulmonic regurgitation.

The aortic root size is normal.

Echo free space indicative of a pericardial fat pad.



CONCLUSIONS

--------

1. The left ventricular size is normal.

2. There is mild concentric left ventricular hypertrophy.

3. Overall left ventricular systolic function is low-normal with, an EF between 50 - 55 %.

4. Inferior basal Hypokinesis

5. The right ventricle is normal in size.

6. LA is moderately dilated 34-39 ml/m2

7. The right atrial size is normal.

8. There is mild aortic valve sclerosis.

9. Mild mitral regurgitation is present.

10. Mild tricuspid regurgitation present.

11. Trace/mild (physiologic)  pulmonic regurgitation.

12. The aortic root size is normal.

13. Echo free space indicative of a pericardial fat pad.





SONOGRAPHER: Anastacia Mcnamara RDCS

## 2021-02-23 ENCOUNTER — HOSPITAL ENCOUNTER (OUTPATIENT)
Dept: HOSPITAL 47 - RADBDWWP | Age: 86
Discharge: HOME | End: 2021-02-23
Attending: FAMILY MEDICINE
Payer: COMMERCIAL

## 2021-02-23 DIAGNOSIS — M81.0: Primary | ICD-10-CM

## 2021-02-23 PROCEDURE — 77080 DXA BONE DENSITY AXIAL: CPT

## 2021-02-23 NOTE — BD
EXAMINATION TYPE: Axial Bone Density

 

DATE OF EXAM: 2/23/2021

 

COMPARISON: 02.14.2019

 

CLINICAL HISTORY: 85 YR OLD FEMALE....ICD-10 CODE:   Z78.0 POST MENOPAUSAL

 

Height:  59

Weight:  188

 

FRAX RISK QUESTIONS:

Family History (Parent hip fracture):  YES

  

RISK FACTORS 

HISTORY OF: 

Surgery to Spine....LUMBAR FUSION, IN 2015

Family History of Osteoporosis: YES, MOTHER WITH BROKEN HIP

Postmenopausal woman: AT ABOUT 57 YR OLD

Lost more than 2 inches in height since high school: YES

Frequent falls: UNSTEADY

Hyperparathyroidism: NO

Adrenal Insufficiency: NO

 

MEDICATIONS: 

Thyroid Medications:  YES, SYNTHROID FOR ABOUT 20+ YRS

Additional Medications: VIT D3, MULTIVITAMIN

Additional History:  NOTHING ADDITIONL TO NOTE HERE

 

EXAM MEASUREMENTS: 

Bone mineral densitometry was performed using the [employer] System.

FUSION OF LUMBAR SPINE.......SPINE NOT SCANNED

 

Bone mineral density about the R hip (g/cm2): 0.716

Bone mineral density about the L hip (g/cm2):  0.662

T Score values are as follows:

-----R Neck: -2.3

-----L Neck:  -2.9

-----R Total:  -2.3

-----L Total:  -2.7

Bone mineral density has: Increased 6.5%   SINCE: 02.14.2019

 

FRAX%s:   THERE IS A 40.9% CHANCE FOR A MAJOR OSTEOPOROTIC FX AND A 30.6% FOR HIP......PROBABILITY FO
R FX IN 10 YRS TIME

 

Bone mineral density about the L Wrist (g/cm2): 0.321

T Score values are as follows: 

-----Dist. R+U: -5.0

-----Prox. R+U: -4.8

-----Radius total: -5.7

Bone mineral density has: Increased 13.6%  SINCE: 02.14.2019

 

IMPRESSION:

Osteoporosis (T Score less than -2.5) remains present.

 

There is increased fracture risk and therapy is usually indicated based on age.

 

Re-Screen 1-2 years.

 

NOTE:  T-SCORE=SD OF THE YOUNG ADULT MEAN.

## 2021-09-26 ENCOUNTER — HOSPITAL ENCOUNTER (EMERGENCY)
Dept: HOSPITAL 47 - EC | Age: 86
Discharge: OTHER INSTITUTION | End: 2021-09-26
Payer: MEDICARE

## 2021-09-26 VITALS — HEART RATE: 107 BPM | SYSTOLIC BLOOD PRESSURE: 104 MMHG | DIASTOLIC BLOOD PRESSURE: 67 MMHG

## 2021-09-26 VITALS — TEMPERATURE: 98 F | RESPIRATION RATE: 18 BRPM

## 2021-09-26 DIAGNOSIS — E11.9: ICD-10-CM

## 2021-09-26 DIAGNOSIS — E80.6: ICD-10-CM

## 2021-09-26 DIAGNOSIS — M19.90: ICD-10-CM

## 2021-09-26 DIAGNOSIS — Z88.6: ICD-10-CM

## 2021-09-26 DIAGNOSIS — Z79.899: ICD-10-CM

## 2021-09-26 DIAGNOSIS — Z90.710: ICD-10-CM

## 2021-09-26 DIAGNOSIS — E20.9: ICD-10-CM

## 2021-09-26 DIAGNOSIS — K81.9: ICD-10-CM

## 2021-09-26 DIAGNOSIS — Z88.8: ICD-10-CM

## 2021-09-26 DIAGNOSIS — K85.90: Primary | ICD-10-CM

## 2021-09-26 DIAGNOSIS — Z88.5: ICD-10-CM

## 2021-09-26 DIAGNOSIS — Z79.890: ICD-10-CM

## 2021-09-26 LAB
ALBUMIN SERPL-MCNC: 4.1 G/DL (ref 3.5–5)
ALP SERPL-CCNC: 132 U/L (ref 38–126)
ALT SERPL-CCNC: 29 U/L (ref 4–34)
ANION GAP SERPL CALC-SCNC: 9 MMOL/L
APTT BLD: 23.6 SEC (ref 22–30)
AST SERPL-CCNC: 64 U/L (ref 14–36)
BASOPHILS # BLD AUTO: 0 K/UL (ref 0–0.2)
BASOPHILS NFR BLD AUTO: 0 %
BUN SERPL-SCNC: 16 MG/DL (ref 7–17)
CALCIUM SPEC-MCNC: 11.2 MG/DL (ref 8.4–10.2)
CHLORIDE SERPL-SCNC: 100 MMOL/L (ref 98–107)
CO2 SERPL-SCNC: 27 MMOL/L (ref 22–30)
EOSINOPHIL # BLD AUTO: 0 K/UL (ref 0–0.7)
EOSINOPHIL NFR BLD AUTO: 0 %
ERYTHROCYTE [DISTWIDTH] IN BLOOD BY AUTOMATED COUNT: 4.63 M/UL (ref 3.8–5.4)
ERYTHROCYTE [DISTWIDTH] IN BLOOD: 14.3 % (ref 11.5–15.5)
GLUCOSE SERPL-MCNC: 168 MG/DL (ref 74–99)
HCT VFR BLD AUTO: 42.6 % (ref 34–46)
HGB BLD-MCNC: 14.5 GM/DL (ref 11.4–16)
HYALINE CASTS UR QL AUTO: 3 /LPF (ref 0–2)
INR PPP: 1.2 (ref ?–1.2)
LYMPHOCYTES # SPEC AUTO: 0.4 K/UL (ref 1–4.8)
LYMPHOCYTES NFR SPEC AUTO: 7 %
MCH RBC QN AUTO: 31.3 PG (ref 25–35)
MCHC RBC AUTO-ENTMCNC: 34 G/DL (ref 31–37)
MCV RBC AUTO: 91.9 FL (ref 80–100)
MONOCYTES # BLD AUTO: 0.1 K/UL (ref 0–1)
MONOCYTES NFR BLD AUTO: 2 %
NEUTROPHILS # BLD AUTO: 5.2 K/UL (ref 1.3–7.7)
NEUTROPHILS NFR BLD AUTO: 90 %
PH UR: 6 [PH] (ref 5–8)
PLATELET # BLD AUTO: 160 K/UL (ref 150–450)
POTASSIUM SERPL-SCNC: 4.5 MMOL/L (ref 3.5–5.1)
PROT SERPL-MCNC: 7.1 G/DL (ref 6.3–8.2)
PT BLD: 12.4 SEC (ref 9–12)
RBC UR QL: <1 /HPF (ref 0–5)
SODIUM SERPL-SCNC: 136 MMOL/L (ref 137–145)
SP GR UR: 1.02 (ref 1–1.03)
SQUAMOUS UR QL AUTO: 1 /HPF (ref 0–4)
UROBILINOGEN UR QL STRIP: <2 MG/DL (ref ?–2)
WBC # BLD AUTO: 5.7 K/UL (ref 3.8–10.6)
WBC # UR AUTO: 3 /HPF (ref 0–5)

## 2021-09-26 PROCEDURE — 74177 CT ABD & PELVIS W/CONTRAST: CPT

## 2021-09-26 PROCEDURE — 81001 URINALYSIS AUTO W/SCOPE: CPT

## 2021-09-26 PROCEDURE — 85025 COMPLETE CBC W/AUTO DIFF WBC: CPT

## 2021-09-26 PROCEDURE — 93005 ELECTROCARDIOGRAM TRACING: CPT

## 2021-09-26 PROCEDURE — 96374 THER/PROPH/DIAG INJ IV PUSH: CPT

## 2021-09-26 PROCEDURE — 80053 COMPREHEN METABOLIC PANEL: CPT

## 2021-09-26 PROCEDURE — 99285 EMERGENCY DEPT VISIT HI MDM: CPT

## 2021-09-26 PROCEDURE — 85730 THROMBOPLASTIN TIME PARTIAL: CPT

## 2021-09-26 PROCEDURE — 85610 PROTHROMBIN TIME: CPT

## 2021-09-26 PROCEDURE — 36415 COLL VENOUS BLD VENIPUNCTURE: CPT

## 2021-09-26 PROCEDURE — 99284 EMERGENCY DEPT VISIT MOD MDM: CPT

## 2021-09-26 PROCEDURE — 83690 ASSAY OF LIPASE: CPT

## 2021-09-26 NOTE — CT
EXAMINATION TYPE: CT abdomen pelvis w con

 

DATE OF EXAM: 9/26/2021

 

COMPARISON: 1/23/2015

 

HISTORY: abdominal pain

 

CT DLP: 1316.4 mGycm

Automated exposure control for dose reduction was used.

 

CONTRAST: 

Performed with IV Contrast, patient injected with 100 mL of Isovue 300.

 

 

Images obtained from the diaphragm to the floor the pelvis with IV contrast.

 

There is some mild atelectasis at the lung bases. Heart is enlarged. There is no pericardial effusion
. There are numerous varicose veins at the gastroesophageal junction and at the spleen. There is mild
 edema around the entire pancreas. Spleen is intact. Liver is intact. Liver is somewhat irregular and
 consistent with cirrhosis. There is dilated gallbladder measuring 5 x 10 cm. The bile ducts are not 
dilated.

 

There is no adrenal mass. Kidneys show satisfactory contrast opacification. There is no hydronephrosi
s. Ureters are not dilated. Delayed images show normal renal excretion. There is no retroperitoneal a
denopathy. There is contrast opacification of the portal venous system. There is no thrombosis. The b
ladder distends smoothly. There is no inguinal hernia. There is no free fluid in the pelvis. There is
 no ascites. Appendix not seen. No sign of thickened appendix.

There is no evidence of free air.

The lumbar vertebra have normal alignment. There is posterior fusion surgery from L3 to L5. There is 
no compression fracture. The bony pelvis is intact. There is some narrowing of the hip joint spaces.

 

IMPRESSION:

Varicose veins consistent with portal venous hypertension and appear increased slightly compared to o
ld exam. There is probably a hepatic cirrhosis. There is retroperitoneal edema around the pancreas patton
ggestive of some pancreatitis that is a change compared to old exam.

 

Dilated gallbladder is a change and consistent with acute cholecystitis.

## 2021-09-26 NOTE — ED
General Adult HPI





- General


Source: patient


Mode of arrival: ambulatory


Limitations: no limitations





<FranciscoMichaelHuy D - Last Filed: 21 14:49>





<Oscar Hsieh - Last Filed: 21 17:01>





- General


Chief complaint: Abdominal Pain


Stated complaint: abd pain, vomiting


Time Seen by Provider: 21 13:25





- History of Present Illness


Initial comments: 


Dictation was produced using dragon dictation software. please excuse any 

grammatical, word or spelling errors. 











Chief Complaint: 86-year-old female presents emergency department for nausea 

vomiting and epigastric abdominal pain since yesterday.





History of Present Illness: Patient is an 86-year-old female she states that 

last night she began having nausea vomiting and epigastric pain.  Patient states

that she had something to eat yesterday and immediately began feeling epigastric

abdominal pain.  She describes it as a gnawing discomfort.  Denies any 

radiation.  She had multiple episodes of emesis.  She states that her emesis is 

nonbilious not bloody.  She denies any fever.  She has had C-sections but no 

other abdominal surgeries.  Denies any diarrhea.








The ROS documented in this emergency department record has been reviewed and 

confirmed by me.  Those systems with pertinent positive or negative responses 

have been documented in the HPI.  All other systems are other negative and/or 

noncontributory.








PHYSICAL EXAM:


General Impression: Alert and oriented x3, not in acute distress


HEENT: Normocephalic atraumatic, extra-ocular movements intact, pupils equal and

reactive to light bilaterally, mucous membranes moist.


Cardiovascular: Heart regular rate and rhythm


Chest: Able to complete full sentences, no retractions, no tachypnea


Abdomen: abdomen soft, mild tenderness to palpation of the epigastric area., 

non-distended, no organomegaly


Musculoskeletal: Pulses present and equal in all extremities, no peripheral 

edema


Motor:  no focal deficits noted


Neurological: CN II-XII grossly intact, no focal motor or sensory deficits noted


Skin: Intact with no visualized rashes


Psych: Normal affect and mood





ED course: 86-year-old female presents to the emergency department for 

epigastric abdominal pain, nausea and nonbilious nonbloody vomiting.  Signs upon

arrival are within acceptable limits.  Patient evaluated bedside found to be 

stable medical condition.  She is well-appearing not have any active retching or

vomiting.  She does have mild palpable tenderness to the epigastric area.





CBC is unremarkable.  Coag panel is negative.  Metabolic panel shows elevated 

calcium level 1I.2.  No obvious source for hypercalcemia.  Liver labs are mildly

elevated.  Pending lipase level, urinalysis and abdominal x-ray.  patient 

reevaluated after GI cocktail and reported no change with her symptoms. repeat 

abdominal exam shows continued epigastric palpatory tenderness.  no obvious 

pulsatile abdominal mass. she is still having significant pain.   IV analgesics 

provided.  Patient signed out to Dr. Hsieh for follow-up of pending labs, CT. and

final disposition.





EKG interpretation: Ventricular rate 73, sinus rhythm,.  Interval to 10, QRS 

140, QTc 456, right bundle branch block. No SC prolongation, no QTC prolongat

ion, no ST or T-wave changes noted.  No old EKG for comparison.  Overall this 

EKG is nonspecific.


 (Huy Singh)





- Related Data


                                Home Medications











 Medication  Instructions  Recorded  Confirmed


 


Cholecalciferol [Vitamin D3 (25 1,000 unit PO BID 02/09/15 09/26/21





Mcg = 1000 Iu)]   


 


Omega-3 Fatty Acids/Fish Oil [Fish 1,000 mg PO DAILY 18





Oil 1,000 mg Softgel]   


 


Vitamin B Complex 1 cap PO DAILY 18


 


Vitamin E (Dl,Tocopheryl Acet) 400 unit PO DAILY 18





[Vitamin E]   


 


Furosemide [Lasix] 40 mg PO DAILY 21


 


Glucosamine HCl/Chondroitin Dumont 1 cap PO BID 21





[Glucosamine-Chondroitin Cap]   


 


Levothyroxine Sodium [Synthroid] 100 mcg PO DAILY 21


 


Potassium Chloride ER [K-Dur 20] 40 meq PO DAILY 21


 


Spironolactone [Aldactone] 12.5 mg PO DAILY 21


 


Ubidecarenone [Co Q-10] 100 mg PO DAILY 21


 


rOPINIRole HCL [Requip] 2 mg PO TID PRN 21











                                    Allergies











Allergy/AdvReac Type Severity Reaction Status Date / Time


 


acetaminophen [From Percocet] Allergy  Vomiting Verified 21 14:29


 


hydrochlorothiazide Allergy  Nausea & Verified 21 14:29





   Vomiting  


 


levofloxacin [From Levaquin] Allergy  n/v, Verified 21 14:29





   severe  





   muscle pain  


 


oxycodone HCl [From Percocet] Allergy  Vomiting Verified 21 14:29


 


warfarin sodium Allergy  GI BLEED Verified 21 14:29





[From Coumadin]     


 


levothyroxine sodium AdvReac  N/V, Verified 21 14:31





[From Synthroid]   SEVERE  





   WEAKNESS  














Review of Systems


ROS Other: All systems not noted in ROS Statement are negative.





<Huy Singh - Last Filed: 21 14:49>


ROS Other: All systems not noted in ROS Statement are negative.





<Oscar Hsieh - Last Filed: 21 17:01>


ROS Statement: 


Those systems with pertinent positive or pertinent negative responses have been 

documented in the HPI.








Past Medical History


Past Medical History: Diabetes Mellitus, GI Bleed, Osteoarthritis (OA), Thyroid 

Disorder


Additional Past Medical History / Comment(s): kidney stones, PSEUDO GOUT ,PEPTIC

ULCERS, varicose veins, restless leg syndrome, diet control diabetic,


History of Any Multi-Drug Resistant Organisms: None Reported


Past Surgical History: Back Surgery,  Section, Hysterectomy, Orthopedic 

Surgery, Tonsillectomy


Additional Past Surgical History / Comment(s): D&C, lithotripsy x2, 

tonsillectomy x 2, C/S x3, surgery to removed large stones from elizabeth kidneys, elizabeth

cataracts, elizabeth knee replacements, parathyroid surgery, spinal fusion, vein 

strippiing left leg


Past Anesthesia/Blood Transfusion Reactions: Motion Sickness


Past Psychological History: No Psychological Hx Reported


Smoking Status: Never smoker


Past Alcohol Use History: None Reported


Past Drug Use History: None Reported





- Past Family History


  ** Brother(s)


Family Medical History: Cancer


Additional Family Medical History / Comment(s): colon





  ** Father


Family Medical History: Cancer


Additional Family Medical History / Comment(s): colon





<Huy Singh - Last Filed: 21 14:49>





General Exam


Limitations: no limitations





<Huy Singh - Last Filed: 21 14:49>





Course





                                   Vital Signs











  21





  13:16


 


Temperature 98.0 F


 


Pulse Rate 89


 


Respiratory 18





Rate 


 


Blood Pressure 117/70


 


O2 Sat by Pulse 95





Oximetry 














Medical Decision Making





- Lab Data


Result diagrams: 


                                 21 13:50





                                 21 13:50





<Huy Signh - Last Filed: 21 14:49>





- Lab Data


Result diagrams: 


                                 21 13:50





                                 21 13:50





- Radiology Data


Radiology results: report reviewed (Imaging reviewed and is evidence of dilated 

gallbladder with evidence of cirrhotic changes of the liver gallbladder measures

5 x 10 cm, bile duct appears be within normal limits also evidence of varicose 

veins at the GE junction and the spleen.), image reviewed





<Oscar Hsieh - Last Filed: 21 17:01>





- Medical Decision Making





I did discuss the findings with the patient also with Dr. Wynn who is 

declining admission due to no GI coverage for the next week.  I did discuss 

findings with the patient be transferred to [facility] I did discuss the 

case with Dr. Callawayerr was agreed to set the patient year to year transfer. 

(Oscar Hsieh)





- Lab Data





                                   Lab Results











  21 Range/Units





  13:50 13:50 13:50 


 


WBC  5.7    (3.8-10.6)  k/uL


 


RBC  4.63    (3.80-5.40)  m/uL


 


Hgb  14.5    (11.4-16.0)  gm/dL


 


Hct  42.6    (34.0-46.0)  %


 


MCV  91.9    (80.0-100.0)  fL


 


MCH  31.3    (25.0-35.0)  pg


 


MCHC  34.0    (31.0-37.0)  g/dL


 


RDW  14.3    (11.5-15.5)  %


 


Plt Count  160    (150-450)  k/uL


 


MPV  7.6    


 


Neutrophils %  90    %


 


Lymphocytes %  7    %


 


Monocytes %  2    %


 


Eosinophils %  0    %


 


Basophils %  0    %


 


Neutrophils #  5.2    (1.3-7.7)  k/uL


 


Lymphocytes #  0.4 L    (1.0-4.8)  k/uL


 


Monocytes #  0.1    (0-1.0)  k/uL


 


Eosinophils #  0.0    (0-0.7)  k/uL


 


Basophils #  0.0    (0-0.2)  k/uL


 


PT     (9.0-12.0)  sec


 


INR     (<1.2)  


 


APTT     (22.0-30.0)  sec


 


Sodium    136 L  (137-145)  mmol/L


 


Potassium    4.5  (3.5-5.1)  mmol/L


 


Chloride    100  ()  mmol/L


 


Carbon Dioxide    27  (22-30)  mmol/L


 


Anion Gap    9  mmol/L


 


BUN    16  (7-17)  mg/dL


 


Creatinine    0.82  (0.52-1.04)  mg/dL


 


Est GFR (CKD-EPI)AfAm    76  (>60 ml/min/1.73 sqM)  


 


Est GFR (CKD-EPI)NonAf    66  (>60 ml/min/1.73 sqM)  


 


Glucose    168 H  (74-99)  mg/dL


 


Calcium    11.2 H  (8.4-10.2)  mg/dL


 


Total Bilirubin    1.6 H  (0.2-1.3)  mg/dL


 


AST    64 H  (14-36)  U/L


 


ALT    29  (4-34)  U/L


 


Alkaline Phosphatase    132 H  ()  U/L


 


Total Protein    7.1  (6.3-8.2)  g/dL


 


Albumin    4.1  (3.5-5.0)  g/dL


 


Lipase    7359 H  ()  U/L


 


Urine Color   Yellow   


 


Urine Appearance   Clear   (Clear)  


 


Urine pH   6.0   (5.0-8.0)  


 


Ur Specific Gravity   1.016   (1.001-1.035)  


 


Urine Protein   Negative   (Negative)  


 


Urine Glucose (UA)   Negative   (Negative)  


 


Urine Ketones   Negative   (Negative)  


 


Urine Blood   Negative   (Negative)  


 


Urine Nitrite   Negative   (Negative)  


 


Urine Bilirubin   Negative   (Negative)  


 


Urine Urobilinogen   <2.0   (<2.0)  mg/dL


 


Ur Leukocyte Esterase   Small H   (Negative)  


 


Urine RBC   <1   (0-5)  /hpf


 


Urine WBC   3   (0-5)  /hpf


 


Ur Squamous Epith Cells   1   (0-4)  /hpf


 


Urine Bacteria   Rare H   (None)  /hpf


 


Hyaline Casts   3 H   (0-2)  /lpf


 


Urine Mucus   Rare H   (None)  /hpf














  / Range/Units





  13:50 


 


WBC   (3.8-10.6)  k/uL


 


RBC   (3.80-5.40)  m/uL


 


Hgb   (11.4-16.0)  gm/dL


 


Hct   (34.0-46.0)  %


 


MCV   (80.0-100.0)  fL


 


MCH   (25.0-35.0)  pg


 


MCHC   (31.0-37.0)  g/dL


 


RDW   (11.5-15.5)  %


 


Plt Count   (150-450)  k/uL


 


MPV   


 


Neutrophils %   %


 


Lymphocytes %   %


 


Monocytes %   %


 


Eosinophils %   %


 


Basophils %   %


 


Neutrophils #   (1.3-7.7)  k/uL


 


Lymphocytes #   (1.0-4.8)  k/uL


 


Monocytes #   (0-1.0)  k/uL


 


Eosinophils #   (0-0.7)  k/uL


 


Basophils #   (0-0.2)  k/uL


 


PT  12.4 H  (9.0-12.0)  sec


 


INR  1.2 H  (<1.2)  


 


APTT  23.6  (22.0-30.0)  sec


 


Sodium   (137-145)  mmol/L


 


Potassium   (3.5-5.1)  mmol/L


 


Chloride   ()  mmol/L


 


Carbon Dioxide   (22-30)  mmol/L


 


Anion Gap   mmol/L


 


BUN   (7-17)  mg/dL


 


Creatinine   (0.52-1.04)  mg/dL


 


Est GFR (CKD-EPI)AfAm   (>60 ml/min/1.73 sqM)  


 


Est GFR (CKD-EPI)NonAf   (>60 ml/min/1.73 sqM)  


 


Glucose   (74-99)  mg/dL


 


Calcium   (8.4-10.2)  mg/dL


 


Total Bilirubin   (0.2-1.3)  mg/dL


 


AST   (14-36)  U/L


 


ALT   (4-34)  U/L


 


Alkaline Phosphatase   ()  U/L


 


Total Protein   (6.3-8.2)  g/dL


 


Albumin   (3.5-5.0)  g/dL


 


Lipase   ()  U/L


 


Urine Color   


 


Urine Appearance   (Clear)  


 


Urine pH   (5.0-8.0)  


 


Ur Specific Gravity   (1.001-1.035)  


 


Urine Protein   (Negative)  


 


Urine Glucose (UA)   (Negative)  


 


Urine Ketones   (Negative)  


 


Urine Blood   (Negative)  


 


Urine Nitrite   (Negative)  


 


Urine Bilirubin   (Negative)  


 


Urine Urobilinogen   (<2.0)  mg/dL


 


Ur Leukocyte Esterase   (Negative)  


 


Urine RBC   (0-5)  /hpf


 


Urine WBC   (0-5)  /hpf


 


Ur Squamous Epith Cells   (0-4)  /hpf


 


Urine Bacteria   (None)  /hpf


 


Hyaline Casts   (0-2)  /lpf


 


Urine Mucus   (None)  /hpf














Disposition





<Huy Singh - Last Filed: 21 14:49>





- Out of Hospital Transfer - Req. Specs


Out of Hospital Transfer - Requested Specifics: Other Emergency Center





<Oscar Hsieh - Last Filed: 21 17:01>


Clinical Impression: 


 Pancreatitis, Cholecystitis, Abdominal pain, Nausea & vomiting, 

Hyperbilirubinemia





Disposition: OTHER INSTITUTION NOT DEFINED


Condition: Stable


Referrals: 


Cristopher Awan MD [Primary Care Provider] - 1-2 days

## 2021-11-06 ENCOUNTER — HOSPITAL ENCOUNTER (OUTPATIENT)
Dept: HOSPITAL 47 - RADMRIMAIN | Age: 86
Discharge: HOME | End: 2021-11-06
Payer: MEDICARE

## 2021-11-06 DIAGNOSIS — K80.20: Primary | ICD-10-CM

## 2021-11-06 PROCEDURE — 74183 MRI ABD W/O CNTR FLWD CNTR: CPT

## 2021-11-06 NOTE — MR
MRI abdomen with and without contrast.

 

HISTORY: Pancreatitis.

 

COMPARISON: None. 

 

Technique: Multiecho multiplanar images of the abdomen were obtained without contrast. Multiple phase
 postcontrast images were obtained.

 

FINDINGS:

 

The visualized lung bases are clear.

 

There is mild lobulation liver which could represent mild cirrhosis.

 

The gallbladder is not distended. There is a suggestion of multiple tiny gallstones. There is no bili
isabell ductal dilatation.

 

There is no focal mass within the liver, pancreas or spleen. No inflammatory changes are identified i
n the peripancreatic tissues. The pancreas is atrophic.

 

The kidneys excrete contrast promptly and symmetrically and there is no solid renal mass or hydroneph
rosis. There is no retroperitoneal adenopathy or hemorrhage in the caliber of the abdominal aorta is 
normal.

 

The bowel loops are normal in caliber.

 

There is no free intraperitoneal air or fluid.

 

IMPRESSION:

1. Suggestion of mild cirrhotic changes involving the liver.

2. Multiple tiny gallstones.

3. No pancreatic enlargement , peripancreatic inflammation or pseudocyst formation to suggest pancrea
titis

## 2022-04-14 ENCOUNTER — HOSPITAL ENCOUNTER (INPATIENT)
Dept: HOSPITAL 47 - EC | Age: 87
LOS: 5 days | Discharge: HOME HEALTH | DRG: 603 | End: 2022-04-19
Payer: MEDICARE

## 2022-04-14 DIAGNOSIS — Z79.899: ICD-10-CM

## 2022-04-14 DIAGNOSIS — Z87.11: ICD-10-CM

## 2022-04-14 DIAGNOSIS — Z88.5: ICD-10-CM

## 2022-04-14 DIAGNOSIS — Z90.710: ICD-10-CM

## 2022-04-14 DIAGNOSIS — Z79.890: ICD-10-CM

## 2022-04-14 DIAGNOSIS — I10: ICD-10-CM

## 2022-04-14 DIAGNOSIS — R60.0: ICD-10-CM

## 2022-04-14 DIAGNOSIS — E11.69: ICD-10-CM

## 2022-04-14 DIAGNOSIS — G25.81: ICD-10-CM

## 2022-04-14 DIAGNOSIS — Z79.1: ICD-10-CM

## 2022-04-14 DIAGNOSIS — Z88.8: ICD-10-CM

## 2022-04-14 DIAGNOSIS — E86.0: ICD-10-CM

## 2022-04-14 DIAGNOSIS — Z88.1: ICD-10-CM

## 2022-04-14 DIAGNOSIS — N17.9: ICD-10-CM

## 2022-04-14 DIAGNOSIS — E78.5: ICD-10-CM

## 2022-04-14 DIAGNOSIS — Z98.1: ICD-10-CM

## 2022-04-14 DIAGNOSIS — E03.9: ICD-10-CM

## 2022-04-14 DIAGNOSIS — M19.90: ICD-10-CM

## 2022-04-14 DIAGNOSIS — Z87.442: ICD-10-CM

## 2022-04-14 DIAGNOSIS — L03.116: Primary | ICD-10-CM

## 2022-04-14 DIAGNOSIS — I83.90: ICD-10-CM

## 2022-04-14 DIAGNOSIS — Z96.653: ICD-10-CM

## 2022-04-14 LAB
ALBUMIN SERPL-MCNC: 4 G/DL (ref 3.5–5)
ALP SERPL-CCNC: 128 U/L (ref 38–126)
ALT SERPL-CCNC: 18 U/L (ref 4–34)
ANION GAP SERPL CALC-SCNC: 11 MMOL/L
AST SERPL-CCNC: 35 U/L (ref 14–36)
BASOPHILS # BLD AUTO: 0.1 K/UL (ref 0–0.2)
BASOPHILS NFR BLD AUTO: 2 %
BUN SERPL-SCNC: 24 MG/DL (ref 7–17)
CALCIUM SPEC-MCNC: 10.9 MG/DL (ref 8.4–10.2)
CHLORIDE SERPL-SCNC: 100 MMOL/L (ref 98–107)
CO2 SERPL-SCNC: 31 MMOL/L (ref 22–30)
EOSINOPHIL # BLD AUTO: 0.2 K/UL (ref 0–0.7)
EOSINOPHIL NFR BLD AUTO: 3 %
ERYTHROCYTE [DISTWIDTH] IN BLOOD BY AUTOMATED COUNT: 4.83 M/UL (ref 3.8–5.4)
ERYTHROCYTE [DISTWIDTH] IN BLOOD: 13.2 % (ref 11.5–15.5)
GLUCOSE BLD-MCNC: 133 MG/DL (ref 75–99)
GLUCOSE SERPL-MCNC: 115 MG/DL (ref 74–99)
HCT VFR BLD AUTO: 44.4 % (ref 34–46)
HGB BLD-MCNC: 14.4 GM/DL (ref 11.4–16)
LYMPHOCYTES # SPEC AUTO: 1.2 K/UL (ref 1–4.8)
LYMPHOCYTES NFR SPEC AUTO: 26 %
MCH RBC QN AUTO: 29.9 PG (ref 25–35)
MCHC RBC AUTO-ENTMCNC: 32.5 G/DL (ref 31–37)
MCV RBC AUTO: 91.9 FL (ref 80–100)
MONOCYTES # BLD AUTO: 0.4 K/UL (ref 0–1)
MONOCYTES NFR BLD AUTO: 9 %
NEUTROPHILS # BLD AUTO: 2.6 K/UL (ref 1.3–7.7)
NEUTROPHILS NFR BLD AUTO: 56 %
PLATELET # BLD AUTO: 140 K/UL (ref 150–450)
POTASSIUM SERPL-SCNC: 3.9 MMOL/L (ref 3.5–5.1)
PROT SERPL-MCNC: 7.4 G/DL (ref 6.3–8.2)
SODIUM SERPL-SCNC: 142 MMOL/L (ref 137–145)
WBC # BLD AUTO: 4.7 K/UL (ref 3.8–10.6)

## 2022-04-14 PROCEDURE — 80202 ASSAY OF VANCOMYCIN: CPT

## 2022-04-14 PROCEDURE — 96365 THER/PROPH/DIAG IV INF INIT: CPT

## 2022-04-14 PROCEDURE — 83735 ASSAY OF MAGNESIUM: CPT

## 2022-04-14 PROCEDURE — 96366 THER/PROPH/DIAG IV INF ADDON: CPT

## 2022-04-14 PROCEDURE — 86140 C-REACTIVE PROTEIN: CPT

## 2022-04-14 PROCEDURE — 80053 COMPREHEN METABOLIC PANEL: CPT

## 2022-04-14 PROCEDURE — 99285 EMERGENCY DEPT VISIT HI MDM: CPT

## 2022-04-14 PROCEDURE — 80048 BASIC METABOLIC PNL TOTAL CA: CPT

## 2022-04-14 PROCEDURE — 87040 BLOOD CULTURE FOR BACTERIA: CPT

## 2022-04-14 PROCEDURE — 83605 ASSAY OF LACTIC ACID: CPT

## 2022-04-14 PROCEDURE — 83036 HEMOGLOBIN GLYCOSYLATED A1C: CPT

## 2022-04-14 PROCEDURE — 85025 COMPLETE CBC W/AUTO DIFF WBC: CPT

## 2022-04-14 PROCEDURE — 36415 COLL VENOUS BLD VENIPUNCTURE: CPT

## 2022-04-14 RX ADMIN — HYDROCODONE BITARTRATE AND ACETAMINOPHEN PRN EACH: 5; 325 TABLET ORAL at 21:34

## 2022-04-14 RX ADMIN — INSULIN ASPART SCH: 100 INJECTION, SOLUTION INTRAVENOUS; SUBCUTANEOUS at 21:10

## 2022-04-14 RX ADMIN — INSULIN ASPART SCH UNIT: 100 INJECTION, SOLUTION INTRAVENOUS; SUBCUTANEOUS at 22:01

## 2022-04-14 RX ADMIN — Medication SCH MG: at 21:34

## 2022-04-14 RX ADMIN — Medication SCH: at 21:10

## 2022-04-14 RX ADMIN — HEPARIN SODIUM SCH UNIT: 5000 INJECTION INTRAVENOUS; SUBCUTANEOUS at 21:35

## 2022-04-14 RX ADMIN — OXYCODONE HYDROCHLORIDE AND ACETAMINOPHEN SCH MG: 500 TABLET ORAL at 21:34

## 2022-04-14 RX ADMIN — CEFAZOLIN SCH MLS/HR: 330 INJECTION, POWDER, FOR SOLUTION INTRAMUSCULAR; INTRAVENOUS at 17:26

## 2022-04-14 RX ADMIN — HYDROCODONE BITARTRATE AND ACETAMINOPHEN PRN EACH: 5; 325 TABLET ORAL at 18:25

## 2022-04-14 NOTE — P.HPIM
History of Present Illness


H&P Date: 22


Chief Complaint: failed treatment of cellulitis





Patient is a pleasant 86 year old female that presented to the emergency room 

with persistent leg redness and swelling. Patient has been treated for 

cellulitis twice outpatient without resolution. Patient has a pertinent medical 

history of diet controlled diabetes, osteoarthritis, hypothyroidism, kidney 

stones, restless leg syndrome, and varicose veins. Ultrasound of the lower 

extremities was negative for DVT. BUN was 24 and creatinine was 1.26, above 

patient's baseline. Infectious disease was consulted for cellulitis. Blood 

cultures were ordered. White blood cell count was normal. Repeat in the AM.





Review of Systems


Constitutional: Denies chills, Denies fever


Ears, nose, mouth and throat: Denies dysphagia, Denies vertigo


Cardiovascular: Reports edema, Denies chest pain, Denies shortness of breath


Respiratory: Denies dyspnea


Gastrointestinal: Denies abdominal pain, Denies nausea, Denies vomiting


Genitourinary: Denies dysuria, Denies pelvic pain


Musculoskeletal: Reports shooting leg pain


Integumentary: Reports color changes, Reports foot/leg ulcers, Reports rash





Past Medical History


Past Medical History: Diabetes Mellitus, GI Bleed, Osteoarthritis (OA), Thyroid 

Disorder


Additional Past Medical History / Comment(s): kidney stones, PSEUDO GOUT ,PEPTIC

ULCERS, varicose veins, restless leg syndrome, diet control diabetic,


History of Any Multi-Drug Resistant Organisms: None Reported


Past Surgical History: Back Surgery,  Section, Hysterectomy, Orthopedic 

Surgery, Tonsillectomy


Additional Past Surgical History / Comment(s): D&C, lithotripsy x2, t

onsillectomy x 2, C/S x3, surgery to removed large stones from elizabeth kidneys, elizabeth 

cataracts, elizabeth knee replacements, parathyroid surgery, spinal fusion, vein 

strippiing left leg


Past Anesthesia/Blood Transfusion Reactions: Motion Sickness


Past Psychological History: No Psychological Hx Reported


Smoking Status: Never smoker


Past Alcohol Use History: None Reported


Past Drug Use History: None Reported





- Past Family History


  ** Brother(s)


Family Medical History: Cancer


Additional Family Medical History / Comment(s): colon





  ** Father


Family Medical History: Cancer


Additional Family Medical History / Comment(s): colon





Medications and Allergies


                                Home Medications











 Medication  Instructions  Recorded  Confirmed  Type


 


Omega-3 Fatty Acids/Fish Oil [Fish 1,000 mg PO BID 18 History





Oil 1,000 mg Softgel]    


 


Vitamin B Complex 1 cap PO BID 18 History


 


Vitamin E (Dl,Tocopheryl Acet) 400 unit PO BID 18 History





[Vitamin E]    


 


Furosemide [Lasix] 40 mg PO DAILY 21 History


 


Glucosamine HCl/Chondroitin Dumont 1 cap PO BID 21 History





[Glucosamine-Chondroitin Cap]    


 


Levothyroxine Sodium [Synthroid] 100 mcg PO W/BRKFST 21 History


 


Potassium Chloride ER [K-Dur 20] 40 meq PO DAILY 21 History


 


Spironolactone [Aldactone] 12.5 mg PO DAILY 21 History


 


Ubidecarenone [Co Q-10] 200 mg PO DAILY 21 History


 


rOPINIRole HCL [Requip] 6 mg PO HS 21 History


 


Adrenal Complex 1 cap PO BID 22 History


 


Ascorbic Acid [Vitamin C] 500 mg PO BID 22 History


 


Cholecalciferol (Vitamin D3) 125 mcg PO DAILY 22 History





[Vitamin D3 (125 MCG = 5,000 IU)]    


 


Garlic 1,000 mg PO BID 22 History


 


HYDROcodone/APAP 5-325MG [Norco 1 tab PO Q6H PRN 22 History





5-325]    


 


LORazepam [Ativan] 1 mg PO HS 22 History


 


Magnesium Oxide [Mead] 500 mg PO QID 22 History


 


Meloxicam [Mobic] 7.5 mg PO DAILY 22 History


 


Multivitamins, Thera [Multivitamin 1 tab PO DAILY 22 History





(formulary)]    


 


Pantoprazole [Protonix] 40 mg PO DAILY 22 History


 


Zinc 50 mg PO DAILY 22 History








                                    Allergies











Allergy/AdvReac Type Severity Reaction Status Date / Time


 


hydrochlorothiazide AdvReac  Nausea & Verified 22 15:44





   Vomiting  


 


levofloxacin [From Levaquin] AdvReac  n/v, Verified 22 15:44





   severe  





   muscle pain  


 


oxycodone HCl [From Percocet] AdvReac  Vomiting Verified 22 15:44


 


warfarin sodium AdvReac  GI BLEED Verified 22 15:44





[From Coumadin]     














Physical Exam


Vitals: 


                                   Vital Signs











  Temp Pulse Resp BP Pulse Ox


 


 22 12:58  97.4 F L  81  18  123/76  96








                                Intake and Output











 22





 06:59 14:59 22:59


 


Other:   


 


  Weight  81.647 kg 














- Constitutional


General appearance: cooperative, no acute distress, obese





- EENT


Eyes: EOMI, PERRLA


ENT: normal oropharynx





- Neck


Neck: normal ROM





- Respiratory


Respiratory: bilateral: CTA





- Cardiovascular


Heart rate: 80


Rhythm: regular


Heart sounds: normal: S1, S2


  ** radial pulse


Peripheral Pulses: bilateral: Normal





- Gastrointestinal


General gastrointestinal: normal bowel sounds, soft





- Integumentary


Integumentary: cellulitis (hot to palpation), rash





- Neurologic


Neurologic: CNII-XII intact





- Musculoskeletal


Musculoskeletal: gait normal





- Psychiatric


Psychiatric: A&O x's 3, appropriate affect, intact judgment & insight





Results


CBC & Chem 7: 


                                 22 13:45





                                 22 13:45


Labs: 


                  Abnormal Lab Results - Last 24 Hours (Table)











  22 Range/Units





  13:45 13:45 


 


Plt Count  140 L   (150-450)  k/uL


 


Carbon Dioxide   31 H  (22-30)  mmol/L


 


BUN   24 H  (7-17)  mg/dL


 


Creatinine   1.26 H  (0.52-1.04)  mg/dL


 


Glucose   115 H  (74-99)  mg/dL


 


Calcium   10.9 H  (8.4-10.2)  mg/dL


 


Alkaline Phosphatase   128 H  ()  U/L











Venous US: report reviewed





Thrombosis Risk Factor Assmnt





- DVT/VTE Prophylaxis


DVT/VTE Prophylaxis: Pharmacologic Prophylaxis ordered





- Choose All That Apply


Any of the Below Risk Factors Present?: Yes


Each Factor Represents 1 point: Obesity (BMI >25), Swollen legs (current)


Other Risk Factors: Yes


Each Risk Factor Represents 3 Points: Age 75 years or older


Thrombosis Risk Factor Assessment Total Risk Factor Score: 5


Thrombosis Risk Factor Assessment Level: High Risk





Assessment and Plan


Assessment: 





Cellulitis of Left lower extremity


Left leg pain and edema


Acute kidney injury, secondary to dehydration


Hypertension


Hyperlipidemia


Osteoarthritis


restless leg syndrome


Diet controlled diabetes


Plan: 





Infectious disease consult for cellulitis treatment, vancomycin started


Normal saline ordered for hydration, Repeat lab work in the morning


Norco home dose ordered for pain management


Continue to monitor vital signs


Further recommendations to come based on patients clinical course.


Time with Patient: Greater than 30

## 2022-04-14 NOTE — ED
General Adult HPI





- General


Chief complaint: Skin/Abscess/Foreign Body


Stated complaint: Cellulitis


Time Seen by Provider: 22 13:07


Source: patient, family, RN notes reviewed


Mode of arrival: ambulatory


Limitations: no limitations





- History of Present Illness


Initial comments: 


This 86-year-old female presents emergency Department from urgent care for 

failed outpatient treatment of cellulitis.  Patient has been on 2 rounds of 

antibiotics for worsening redness of her left leg.  Patient states is very sore.

 Patient had an ultrasound at pain clinic 2 weeks ago which is negative for 

acute DVT.  Asian denies any fevers or chills.  Patient denies any history of 

DVT.  Patient denies trauma.








- Related Data


                                Home Medications











 Medication  Instructions  Recorded  Confirmed


 


Cholecalciferol [Vitamin D3 (25 1,000 unit PO BID 02/09/15 09/26/21





Mcg = 1000 Iu)]   


 


Omega-3 Fatty Acids/Fish Oil [Fish 1,000 mg PO DAILY 18





Oil 1,000 mg Softgel]   


 


Vitamin B Complex 1 cap PO DAILY 18


 


Vitamin E (Dl,Tocopheryl Acet) 400 unit PO DAILY 18





[Vitamin E]   


 


Furosemide [Lasix] 40 mg PO DAILY 21


 


Glucosamine HCl/Chondroitin Dumont 1 cap PO BID 21





[Glucosamine-Chondroitin Cap]   


 


Levothyroxine Sodium [Synthroid] 100 mcg PO DAILY 21


 


Potassium Chloride ER [K-Dur 20] 40 meq PO DAILY 21


 


Spironolactone [Aldactone] 12.5 mg PO DAILY 21


 


Ubidecarenone [Co Q-10] 100 mg PO DAILY 21


 


rOPINIRole HCL [Requip] 2 mg PO TID PRN 21











                                    Allergies











Allergy/AdvReac Type Severity Reaction Status Date / Time


 


acetaminophen [From Percocet] Allergy  Vomiting Verified 22 13:02


 


hydrochlorothiazide Allergy  Nausea & Verified 22 13:02





   Vomiting  


 


levofloxacin [From Levaquin] Allergy  n/v, Verified 22 13:02





   severe  





   muscle pain  


 


oxycodone HCl [From Percocet] Allergy  Vomiting Verified 22 13:02


 


warfarin sodium Allergy  GI BLEED Verified 22 13:02





[From Coumadin]     


 


levothyroxine sodium AdvReac  N/V, Verified 22 13:02





[From Synthroid]   SEVERE  





   WEAKNESS  














Review of Systems


ROS Statement: 


Those systems with pertinent positive or pertinent negative responses have been 

documented in the HPI.





ROS Other: All systems not noted in ROS Statement are negative.





Past Medical History


Past Medical History: Diabetes Mellitus, GI Bleed, Osteoarthritis (OA), Thyroid 

Disorder


Additional Past Medical History / Comment(s): kidney stones, PSEUDO GOUT ,PEPTIC

ULCERS, varicose veins, restless leg syndrome, diet control diabetic,


History of Any Multi-Drug Resistant Organisms: None Reported


Past Surgical History: Back Surgery,  Section, Hysterectomy, Orthopedic 

Surgery, Tonsillectomy


Additional Past Surgical History / Comment(s): D&C, lithotripsy x2, 

tonsillectomy x 2, C/S x3, surgery to removed large stones from elizabeth kidneys, elizabeth

cataracts, elizabeth knee replacements, parathyroid surgery, spinal fusion, vein 

strippiing left leg


Past Anesthesia/Blood Transfusion Reactions: Motion Sickness


Past Psychological History: No Psychological Hx Reported


Smoking Status: Never smoker


Past Alcohol Use History: None Reported


Past Drug Use History: None Reported





- Past Family History


  ** Brother(s)


Family Medical History: Cancer


Additional Family Medical History / Comment(s): colon





  ** Father


Family Medical History: Cancer


Additional Family Medical History / Comment(s): colon





General Exam


Limitations: no limitations


General appearance: alert, in no apparent distress


Head exam: Present: atraumatic, normocephalic, normal inspection


Eye exam: Present: normal appearance, PERRL, EOMI.  Absent: scleral icterus, 

conjunctival injection, periorbital swelling


ENT exam: Present: normal exam, normal oropharynx, mucous membranes moist


Neck exam: Present: normal inspection, full ROM.  Absent: tenderness, 

meningismus, lymphadenopathy


Respiratory exam: Present: normal lung sounds bilaterally.  Absent: respiratory 

distress, wheezes, rales, rhonchi, stridor


Cardiovascular Exam: Present: regular rate, normal rhythm, normal heart sounds. 

Absent: systolic murmur, diastolic murmur, rubs, gallop, clicks


Extremities exam: Present: other (Left lower extremities there is erythema, 

increase in warmth, pulses are equal bilaterally, mild tenderness with 

palpation)





Course


                                   Vital Signs











  22





  12:58


 


Temperature 97.4 F L


 


Pulse Rate 81


 


Respiratory 18





Rate 


 


Blood Pressure 123/76


 


O2 Sat by Pulse 96





Oximetry 














Medical Decision Making





- Medical Decision Making





Patient presented for left leg infection.  Patient has persistent cellulitis 

that has failed 2 outpatient course of antibiotics.  Patient will be admitted 

for IV antibiotics.





- Lab Data


Result diagrams: 


                                 22 13:45





                                 22 13:45


                                   Lab Results











  22 Range/Units





  13:45 13:45 13:45 


 


WBC  4.7    (3.8-10.6)  k/uL


 


RBC  4.83    (3.80-5.40)  m/uL


 


Hgb  14.4    (11.4-16.0)  gm/dL


 


Hct  44.4    (34.0-46.0)  %


 


MCV  91.9    (80.0-100.0)  fL


 


MCH  29.9    (25.0-35.0)  pg


 


MCHC  32.5    (31.0-37.0)  g/dL


 


RDW  13.2    (11.5-15.5)  %


 


Plt Count  140 L    (150-450)  k/uL


 


MPV  7.4    


 


Neutrophils %  56    %


 


Lymphocytes %  26    %


 


Monocytes %  9    %


 


Eosinophils %  3    %


 


Basophils %  2    %


 


Neutrophils #  2.6    (1.3-7.7)  k/uL


 


Lymphocytes #  1.2    (1.0-4.8)  k/uL


 


Monocytes #  0.4    (0-1.0)  k/uL


 


Eosinophils #  0.2    (0-0.7)  k/uL


 


Basophils #  0.1    (0-0.2)  k/uL


 


Sodium   142   (137-145)  mmol/L


 


Potassium   3.9   (3.5-5.1)  mmol/L


 


Chloride   100   ()  mmol/L


 


Carbon Dioxide   31 H   (22-30)  mmol/L


 


Anion Gap   11   mmol/L


 


BUN   24 H   (7-17)  mg/dL


 


Creatinine   1.26 H   (0.52-1.04)  mg/dL


 


Est GFR (CKD-EPI)AfAm   45   (>60 ml/min/1.73 sqM)  


 


Est GFR (CKD-EPI)NonAf   39   (>60 ml/min/1.73 sqM)  


 


Glucose   115 H   (74-99)  mg/dL


 


Plasma Lactic Acid Loi    1.4  (0.7-2.0)  mmol/L


 


Calcium   10.9 H   (8.4-10.2)  mg/dL


 


Total Bilirubin   1.1   (0.2-1.3)  mg/dL


 


AST   35   (14-36)  U/L


 


ALT   18   (4-34)  U/L


 


Alkaline Phosphatase   128 H   ()  U/L


 


Total Protein   7.4   (6.3-8.2)  g/dL


 


Albumin   4.0   (3.5-5.0)  g/dL














Disposition


Clinical Impression: 


 Left leg cellulitis, Failure of outpatient treatment





Disposition: ADMITTED AS IP TO THIS HOSP


Condition: Fair


Referrals: 


Cristopher Awan MD [Primary Care Provider] - 1-2 days

## 2022-04-14 NOTE — US
EXAMINATION TYPE: US venous doppler duplex LE LT

 

DATE OF EXAM: 4/14/2022 2:56 PM

 

COMPARISON: 3/11/2018

 

CLINICAL HISTORY: 86-year-old female pain. Cellulitis.  No hx blood clots. On aspirin.  

 

SIDE PERFORMED: Left  

 

TECHNIQUE:  The lower extremity deep venous system is examined utilizing real time linear array sonog
micho[place]ano with graded compression, doppler sonography and color-flow sonography.

 

FINDINGS:

 

VESSELS IMAGED:

Common Femoral Vein

Deep Femoral Vein

Greater Saphenous Vein *

Femoral Vein

Popliteal Vein

Small Saphenous Vein *

Proximal Calf Veins

(* superficial vessels)

 

 

 

Left Leg:  Negative for DVT. Moderate to large fluid collection posterior knee= 7.0 x 5.3 x 3.7 cm 

 

 

 

IMPRESSION:

1. No evidence for DVT within the left lower extremity imaged from the groin to the upper calf.

2. Moderate to large 7.0 cm Baker's cyst.

## 2022-04-15 LAB
ALBUMIN SERPL-MCNC: 3.6 G/DL (ref 3.5–5)
ALBUMIN/GLOB SERPL: 1.2 {RATIO}
ALP SERPL-CCNC: 107 U/L (ref 38–126)
ALT SERPL-CCNC: 15 U/L (ref 4–34)
ANION GAP SERPL CALC-SCNC: 3 MMOL/L
AST SERPL-CCNC: 31 U/L (ref 14–36)
BASOPHILS # BLD AUTO: 0.03 X 10*3/UL (ref 0–0.1)
BASOPHILS NFR BLD AUTO: 0.8 %
BUN SERPL-SCNC: 22 MG/DL (ref 7–17)
CALCIUM SPEC-MCNC: 10.1 MG/DL (ref 8.4–10.2)
CHLORIDE SERPL-SCNC: 104 MMOL/L (ref 98–107)
CO2 SERPL-SCNC: 32 MMOL/L (ref 22–30)
EOSINOPHIL # BLD AUTO: 0.19 X 10*3/UL (ref 0.04–0.35)
EOSINOPHIL NFR BLD AUTO: 5.1 %
ERYTHROCYTE [DISTWIDTH] IN BLOOD BY AUTOMATED COUNT: 4.52 X 10*6/UL (ref 4.1–5.2)
ERYTHROCYTE [DISTWIDTH] IN BLOOD: 13.7 % (ref 11.5–14.5)
GLOBULIN SER CALC-MCNC: 2.9 G/DL
GLUCOSE BLD-MCNC: 121 MG/DL (ref 75–99)
GLUCOSE BLD-MCNC: 131 MG/DL (ref 75–99)
GLUCOSE BLD-MCNC: 79 MG/DL (ref 75–99)
GLUCOSE BLD-MCNC: 99 MG/DL (ref 75–99)
GLUCOSE SERPL-MCNC: 103 MG/DL (ref 74–99)
HCT VFR BLD AUTO: 41.5 % (ref 37.2–46.3)
HGB BLD-MCNC: 13 G/DL (ref 12–15)
IMM GRANULOCYTES BLD QL AUTO: 0.3 %
LYMPHOCYTES # SPEC AUTO: 1.4 X 10*3/UL (ref 0.9–5)
LYMPHOCYTES NFR SPEC AUTO: 37.7 %
MCH RBC QN AUTO: 28.8 PG (ref 27–32)
MCHC RBC AUTO-ENTMCNC: 31.3 G/DL (ref 32–37)
MCV RBC AUTO: 91.8 FL (ref 80–97)
MONOCYTES # BLD AUTO: 0.54 X 10*3/UL (ref 0.2–1)
MONOCYTES NFR BLD AUTO: 14.6 %
NEUTROPHILS # BLD AUTO: 1.54 X 10*3/UL (ref 1.8–7.7)
NEUTROPHILS NFR BLD AUTO: 41.5 %
NRBC BLD AUTO-RTO: 0 /100 WBCS (ref 0–0)
PLATELET # BLD AUTO: 145 X 10*3/UL (ref 140–440)
POTASSIUM SERPL-SCNC: 4 MMOL/L (ref 3.5–5.1)
PROT SERPL-MCNC: 6.5 G/DL (ref 6.3–8.2)
SODIUM SERPL-SCNC: 139 MMOL/L (ref 137–145)
WBC # BLD AUTO: 3.71 X 10*3/UL (ref 4.5–10)

## 2022-04-15 RX ADMIN — INSULIN ASPART SCH: 100 INJECTION, SOLUTION INTRAVENOUS; SUBCUTANEOUS at 21:31

## 2022-04-15 RX ADMIN — INSULIN ASPART SCH: 100 INJECTION, SOLUTION INTRAVENOUS; SUBCUTANEOUS at 08:43

## 2022-04-15 RX ADMIN — HEPARIN SODIUM SCH UNIT: 5000 INJECTION INTRAVENOUS; SUBCUTANEOUS at 20:03

## 2022-04-15 RX ADMIN — CEFAZOLIN SCH MLS/HR: 330 INJECTION, POWDER, FOR SOLUTION INTRAMUSCULAR; INTRAVENOUS at 11:19

## 2022-04-15 RX ADMIN — Medication SCH MG: at 17:39

## 2022-04-15 RX ADMIN — Medication SCH MG: at 13:18

## 2022-04-15 RX ADMIN — OXYCODONE HYDROCHLORIDE AND ACETAMINOPHEN SCH MG: 500 TABLET ORAL at 08:49

## 2022-04-15 RX ADMIN — HYDROCODONE BITARTRATE AND ACETAMINOPHEN PRN EACH: 5; 325 TABLET ORAL at 09:21

## 2022-04-15 RX ADMIN — Medication SCH MG: at 08:48

## 2022-04-15 RX ADMIN — OXYCODONE HYDROCHLORIDE AND ACETAMINOPHEN SCH MG: 500 TABLET ORAL at 20:03

## 2022-04-15 RX ADMIN — INSULIN ASPART SCH: 100 INJECTION, SOLUTION INTRAVENOUS; SUBCUTANEOUS at 17:32

## 2022-04-15 RX ADMIN — MELOXICAM SCH MG: 7.5 TABLET ORAL at 08:50

## 2022-04-15 RX ADMIN — INSULIN ASPART SCH: 100 INJECTION, SOLUTION INTRAVENOUS; SUBCUTANEOUS at 13:11

## 2022-04-15 RX ADMIN — SPIRONOLACTONE SCH MG: 25 TABLET, FILM COATED ORAL at 08:48

## 2022-04-15 RX ADMIN — POTASSIUM CHLORIDE SCH MEQ: 20 TABLET, EXTENDED RELEASE ORAL at 08:48

## 2022-04-15 RX ADMIN — HEPARIN SODIUM SCH UNIT: 5000 INJECTION INTRAVENOUS; SUBCUTANEOUS at 08:48

## 2022-04-15 RX ADMIN — LEVOTHYROXINE SODIUM SCH MCG: 100 TABLET ORAL at 08:49

## 2022-04-15 RX ADMIN — THERA TABS SCH EACH: TAB at 08:49

## 2022-04-15 RX ADMIN — PANTOPRAZOLE SODIUM SCH MG: 40 TABLET, DELAYED RELEASE ORAL at 08:49

## 2022-04-15 RX ADMIN — Medication SCH MG: at 20:08

## 2022-04-15 RX ADMIN — FUROSEMIDE SCH MG: 40 TABLET ORAL at 08:48

## 2022-04-15 NOTE — CDI
Documentation Clarification Form



Date: 04/15/2022 02:15:38 PM

From: Elvia Olmstead RN CCDS 

Phone: [phone]

MRN: [id number]

Admit Date: 04/15/2022 11:45:00 AM

Patient Name: Chelo Kelley

Visit Number: [id number]

Discharge Date:  





ATTENTION: The Clinical Documentation Specialists (CDI) and [facility] Coding Staff 
appreciate your assistance in clarifying documentation. Please respond to the 
clarification below the line at the bottom and electronically sign. The CDI & 
[facility] Coding staff will review the response and follow-up if needed. Please note: 
Queries are made part of the Legal Health Record. If you have any questions, 
please contact the author of this message via ITS.



Dr. Cristopher Awan



Cellulitis is documented 4/14, H&P.  Additional clarification regarding the type
of cellulitis is requested.



History/risk factors: 86-year-old female presents to the ED from Urgent care for
failed outpatient treatment of cellulitis. Medical History: DM, OA and varicose 
veins. 4/14, ED Note. 



Clinical Indicators: 

Labs: 4/14  Glucose 115. 

4/14, H&P Integumentary: cellulitis (hot to palpation) rash.

 

Treatment: 4/14 Novolog sliding scale ACHS; 4/14 Vancomycin 1,500mg IVPB x 1; 
4/15 Vancomycin 1,500mg IVPB x 1. 



Please clarify the type of cellulitis, if known:





   [  X]  Unable to determine

 

(Template Last Revised: March 2021)

MTDD

## 2022-04-16 LAB
ANION GAP SERPL CALC-SCNC: 3 MMOL/L
BASOPHILS # BLD AUTO: 0.02 X 10*3/UL (ref 0–0.1)
BASOPHILS NFR BLD AUTO: 0.5 %
BUN SERPL-SCNC: 20 MG/DL (ref 7–17)
CALCIUM SPEC-MCNC: 9.5 MG/DL (ref 8.4–10.2)
CHLORIDE SERPL-SCNC: 103 MMOL/L (ref 98–107)
CO2 SERPL-SCNC: 30 MMOL/L (ref 22–30)
EOSINOPHIL # BLD AUTO: 0.18 X 10*3/UL (ref 0.04–0.35)
EOSINOPHIL NFR BLD AUTO: 4.4 %
ERYTHROCYTE [DISTWIDTH] IN BLOOD BY AUTOMATED COUNT: 4.08 X 10*6/UL (ref 4.1–5.2)
ERYTHROCYTE [DISTWIDTH] IN BLOOD: 13.7 % (ref 11.5–14.5)
GLUCOSE BLD-MCNC: 100 MG/DL (ref 75–99)
GLUCOSE BLD-MCNC: 113 MG/DL (ref 75–99)
GLUCOSE BLD-MCNC: 113 MG/DL (ref 75–99)
GLUCOSE BLD-MCNC: 93 MG/DL (ref 75–99)
GLUCOSE SERPL-MCNC: 115 MG/DL (ref 74–99)
HCT VFR BLD AUTO: 37.2 % (ref 37.2–46.3)
HGB BLD-MCNC: 12.2 G/DL (ref 12–15)
IMM GRANULOCYTES BLD QL AUTO: 0.2 %
LYMPHOCYTES # SPEC AUTO: 1.01 X 10*3/UL (ref 0.9–5)
LYMPHOCYTES NFR SPEC AUTO: 24.9 %
MCH RBC QN AUTO: 29.9 PG (ref 27–32)
MCHC RBC AUTO-ENTMCNC: 32.8 G/DL (ref 32–37)
MCV RBC AUTO: 91.2 FL (ref 80–97)
MONOCYTES # BLD AUTO: 0.65 X 10*3/UL (ref 0.2–1)
MONOCYTES NFR BLD AUTO: 16 %
NEUTROPHILS # BLD AUTO: 2.18 X 10*3/UL (ref 1.8–7.7)
NEUTROPHILS NFR BLD AUTO: 54 %
NRBC BLD AUTO-RTO: 0 /100 WBCS (ref 0–0)
PLATELET # BLD AUTO: 115 X 10*3/UL (ref 140–440)
POTASSIUM SERPL-SCNC: 4 MMOL/L (ref 3.5–5.1)
SODIUM SERPL-SCNC: 136 MMOL/L (ref 137–145)
WBC # BLD AUTO: 4.05 X 10*3/UL (ref 4.5–10)

## 2022-04-16 RX ADMIN — LEVOTHYROXINE SODIUM SCH MCG: 100 TABLET ORAL at 08:51

## 2022-04-16 RX ADMIN — INSULIN ASPART SCH: 100 INJECTION, SOLUTION INTRAVENOUS; SUBCUTANEOUS at 17:17

## 2022-04-16 RX ADMIN — PANTOPRAZOLE SODIUM SCH MG: 40 TABLET, DELAYED RELEASE ORAL at 08:52

## 2022-04-16 RX ADMIN — OXYCODONE HYDROCHLORIDE AND ACETAMINOPHEN SCH MG: 500 TABLET ORAL at 20:50

## 2022-04-16 RX ADMIN — HYDROCODONE BITARTRATE AND ACETAMINOPHEN PRN EACH: 5; 325 TABLET ORAL at 18:52

## 2022-04-16 RX ADMIN — Medication SCH MG: at 20:50

## 2022-04-16 RX ADMIN — Medication SCH MG: at 13:19

## 2022-04-16 RX ADMIN — HEPARIN SODIUM SCH UNIT: 5000 INJECTION INTRAVENOUS; SUBCUTANEOUS at 20:50

## 2022-04-16 RX ADMIN — FUROSEMIDE SCH MG: 40 TABLET ORAL at 08:52

## 2022-04-16 RX ADMIN — Medication SCH MG: at 08:51

## 2022-04-16 RX ADMIN — HYDROCODONE BITARTRATE AND ACETAMINOPHEN PRN EACH: 5; 325 TABLET ORAL at 00:44

## 2022-04-16 RX ADMIN — THERA TABS SCH EACH: TAB at 08:51

## 2022-04-16 RX ADMIN — INSULIN ASPART SCH: 100 INJECTION, SOLUTION INTRAVENOUS; SUBCUTANEOUS at 08:10

## 2022-04-16 RX ADMIN — CEFAZOLIN SCH: 330 INJECTION, POWDER, FOR SOLUTION INTRAMUSCULAR; INTRAVENOUS at 08:10

## 2022-04-16 RX ADMIN — SPIRONOLACTONE SCH MG: 25 TABLET, FILM COATED ORAL at 08:51

## 2022-04-16 RX ADMIN — INSULIN ASPART SCH: 100 INJECTION, SOLUTION INTRAVENOUS; SUBCUTANEOUS at 12:16

## 2022-04-16 RX ADMIN — POTASSIUM CHLORIDE SCH MEQ: 20 TABLET, EXTENDED RELEASE ORAL at 08:52

## 2022-04-16 RX ADMIN — MELOXICAM SCH MG: 7.5 TABLET ORAL at 08:52

## 2022-04-16 RX ADMIN — Medication SCH MG: at 17:41

## 2022-04-16 RX ADMIN — SODIUM CHLORIDE SCH MLS/HR: 9 INJECTION, SOLUTION INTRAVENOUS at 11:49

## 2022-04-16 RX ADMIN — OXYCODONE HYDROCHLORIDE AND ACETAMINOPHEN SCH MG: 500 TABLET ORAL at 08:51

## 2022-04-16 RX ADMIN — HYDROCODONE BITARTRATE AND ACETAMINOPHEN PRN EACH: 5; 325 TABLET ORAL at 09:33

## 2022-04-16 RX ADMIN — HEPARIN SODIUM SCH UNIT: 5000 INJECTION INTRAVENOUS; SUBCUTANEOUS at 08:51

## 2022-04-16 RX ADMIN — INSULIN ASPART SCH: 100 INJECTION, SOLUTION INTRAVENOUS; SUBCUTANEOUS at 20:45

## 2022-04-16 NOTE — P.CONS
History of Present Illness





- Reason for Consult


Consult date: 04/15/22


Left leg cellulitis


Requesting physician: Saba Almonte





- Chief Complaint


Left leg swelling and pain x few days





- History of Present Illness


Patient is 86-year-old  female with a past medical history significant 

for diabetes mellitus osteomyelitis presenting to the ER with persistent left 

leg redness and swelling that has been going on for more than a week or 2 

apparently the patient has been treated with 2 different courses of antibiotic 

without any improvement one of them was a Bactrim and there was a cephalexin 

however the patient was not very clear about the names patient be complaining of

pain to the left leg more of a dull aching intensity is about 6-8 out of 10 no 

radiation with associated swelling or redness but no drainage patient on 

presentation to the hospital was afebrile patient did have a normal white count 

kidney function was normal liver exams are normal blood cultures obtained which 

are currently pending patient was started on vancomycin infectious disease was 

consulted for further management of antibiotic therapy








Review of Systems


Positive point has been  mentioned in the HPI rest of the systems are negative








Past Medical History


Past Medical History: Diabetes Mellitus, GI Bleed, Osteoarthritis (OA), Thyroid 

Disorder


Additional Past Medical History / Comment(s): kidney stones, PSEUDO GOUT ,PEPTIC

ULCERS, varicose veins, restless leg syndrome, diet control diabetic,


History of Any Multi-Drug Resistant Organisms: None Reported


Past Surgical History: Back Surgery,  Section, Hysterectomy, Orthopedic 

Surgery, Tonsillectomy


Additional Past Surgical History / Comment(s): D&C, lithotripsy x2, 

tonsillectomy x 2, C/S x3, surgery to removed large stones from elizabeth kidneys, elizabeth

cataracts, elizabeth knee replacements, parathyroid surgery, spinal fusion, vein s

trippiing left leg


Past Anesthesia/Blood Transfusion Reactions: Motion Sickness


Past Psychological History: No Psychological Hx Reported


Smoking Status: Never smoker


Past Alcohol Use History: None Reported


Past Drug Use History: None Reported





- Past Family History


  ** Brother(s)


Family Medical History: Cancer


Additional Family Medical History / Comment(s): colon





  ** Father


Family Medical History: Cancer


Additional Family Medical History / Comment(s): colon





Medications and Allergies


                                Home Medications











 Medication  Instructions  Recorded  Confirmed  Type


 


Omega-3 Fatty Acids/Fish Oil [Fish 1,000 mg PO BID 18 History





Oil 1,000 mg Softgel]    


 


Vitamin B Complex 1 cap PO BID 18 History


 


Vitamin E (Dl,Tocopheryl Acet) 400 unit PO BID 18 History





[Vitamin E]    


 


Furosemide [Lasix] 40 mg PO DAILY 21 History


 


Glucosamine HCl/Chondroitin Dumont 1 cap PO BID 21 History





[Glucosamine-Chondroitin Cap]    


 


Levothyroxine Sodium [Synthroid] 100 mcg PO W/BRKFST 21 History


 


Potassium Chloride ER [K-Dur 20] 40 meq PO DAILY 21 History


 


Spironolactone [Aldactone] 12.5 mg PO DAILY 21 History


 


Ubidecarenone [Co Q-10] 200 mg PO DAILY 21 History


 


rOPINIRole HCL [Requip] 6 mg PO HS 21 History


 


Adrenal Complex 1 cap PO BID 22 History


 


Ascorbic Acid [Vitamin C] 500 mg PO BID 22 History


 


Cholecalciferol (Vitamin D3) 125 mcg PO DAILY 22 History





[Vitamin D3 (125 MCG = 5,000 IU)]    


 


Garlic 1,000 mg PO BID 22 History


 


HYDROcodone/APAP 5-325MG [Norco 1 tab PO Q6H PRN 22 History





5-325]    


 


LORazepam [Ativan] 1 mg PO HS 22 History


 


Magnesium Oxide [Mead] 500 mg PO QID 22 History


 


Meloxicam [Mobic] 7.5 mg PO DAILY 22 History


 


Multivitamins, Thera [Multivitamin 1 tab PO DAILY 22 History





(formulary)]    


 


Pantoprazole [Protonix] 40 mg PO DAILY 22 History


 


Zinc 50 mg PO DAILY 22 History








                                    Allergies











Allergy/AdvReac Type Severity Reaction Status Date / Time


 


hydrochlorothiazide AdvReac  Nausea & Verified 22 15:44





   Vomiting  


 


levofloxacin [From Levaquin] AdvReac  n/v, Verified 22 15:44





   severe  





   muscle pain  


 


oxycodone HCl [From Percocet] AdvReac  Vomiting Verified 22 15:44


 


warfarin sodium AdvReac  GI BLEED Verified 22 15:44





[From Coumadin]     














Physical Exam


Vitals: 


                                   Vital Signs











  Temp Pulse Pulse Pulse Resp BP BP


 


 04/15/22 07:00  97.5 F L    67  18   119/69


 


 04/15/22 02:18  97.7 F   70   15   94/52


 


 22 20:43  97.8 F   72   16   118/55


 


 22 17:20  97.4 F L  78    17  129/74 


 


 22 12:58  97.4 F L  81    18  123/76 














  Pulse Ox


 


 04/15/22 07:00  96


 


 04/15/22 02:18  94 L


 


 22 20:43  100


 


 22 17:20  99


 


 22 12:58  96








                                Intake and Output











 04/14/22 04/15/22 04/15/22





 22:59 06:59 14:59


 


Intake Total   240


 


Balance   240


 


Intake:   


 


  Oral   240


 


Other:   


 


  # Voids 1 2 1


 


  Weight 81.647 kg  











GENERAL DESCRIPTION: Elderly female lying in bed, no distress. No tachypnea or 

accessory muscle of respiration use.


HEENT: Shows Pallor , no scleral icterus. Oral mucous membrane is dry. No 

pharyngeal erythema or thrush


NECK: Trachea central, no thyromegaly.


LUNGS: Unlabored breathing. Clear to auscultation anteriorly. No wheeze or 

crackle.


HEART: S1, S2, regular rate and rhythm. No loud murmur


ABDOMEN: Soft, no tenderness , guarding or rigidity, no organomegaly


EXTREMITIES: Left lower leg with minimal swelling redness and slightly warm to 

touch.


SKIN: No rash, no masses palpable.


NEUROLOGICAL: The patient is awake, alert, oriented x3, mood and affect normal.

















Results


CBC & Chem 7: 


                                 04/15/22 07:31





                                 04/15/22 07:31


Labs: 


                  Abnormal Lab Results - Last 24 Hours (Table)











  22 Range/Units





  13:45 13:45 21:33 


 


WBC     (4.50-10.00)  X 10*3/uL


 


MCHC     (32.0-37.0)  g/dL


 


Plt Count  140 L    (150-450)  k/uL


 


Neutrophils #     (1.80-7.70)  X 10*3/uL


 


Carbon Dioxide   31 H   (22-30)  mmol/L


 


BUN   24 H   (7-17)  mg/dL


 


Creatinine   1.26 H   (0.52-1.04)  mg/dL


 


Glucose   115 H   (74-99)  mg/dL


 


POC Glucose (mg/dL)    133 H  (75-99)  mg/dL


 


Calcium   10.9 H   (8.4-10.2)  mg/dL


 


Alkaline Phosphatase   128 H   ()  U/L














  04/15/22 04/15/22 Range/Units





  07:31 07:31 


 


WBC   3.71 L  (4.50-10.00)  X 10*3/uL


 


MCHC   31.3 L  (32.0-37.0)  g/dL


 


Plt Count    (150-450)  k/uL


 


Neutrophils #   1.54 L  (1.80-7.70)  X 10*3/uL


 


Carbon Dioxide  32 H   (22-30)  mmol/L


 


BUN  22 H   (7-17)  mg/dL


 


Creatinine    (0.52-1.04)  mg/dL


 


Glucose  103 H   (74-99)  mg/dL


 


POC Glucose (mg/dL)    (75-99)  mg/dL


 


Calcium    (8.4-10.2)  mg/dL


 


Alkaline Phosphatase    ()  U/L














Assessment and Plan


(1) Failure of outpatient treatment


Current Visit: Yes   Status: Acute   Code(s): Z78.9 - OTHER SPECIFIED HEALTH 

STATUS   SNOMED Code(s): 809987418


   





(2) Left leg cellulitis


Current Visit: Yes   Status: Acute   Code(s): L03.116 - CELLULITIS OF LEFT LOWER

LIMB   SNOMED Code(s): 361112772


   


Plan: 


1patient presented the hospital with left leg pain and swelling and is failing 

outpatient oral Bactrim and cephalexin concerning for cellulitis failing 

outpatient oral antibiotic therapy  more likely from gram-positive skin dank, 

clinically not behaving as an abscess


2-Marked area of the redness


3-Ace wrap to the leg to keep the swelling down


4-vancomycin pharmacy to dose target trough of 15 while watching  kidney 

function and vancomycin trough closely


We will follow on clinical condition and cultures to further adjust medication 

if needed


Thank you for this consultation will follow this patient along with you

## 2022-04-16 NOTE — P.PN
Subjective


Progress Note Date: 04/16/22


Principal diagnosis: 


Left leg cellulitis





Patient is a 86-year-old  female presenting to the hospital with 

worsening pain swelling redness the left leg failing outpatient oral Bactrim and

cephalexin therapy.


On today's evaluation her that is 04/16/2022, the patient denies having any 

fever or any chills, the patient is feeling slightly better today as for his 

discomfort to the left leg is concerned, no chest pain shortness of breath or 

cough no abdominal pain and no diarrhea








Objective





- Vital Signs


Vital signs: 


                                   Vital Signs











Temp  98.3 F   04/16/22 14:31


 


Pulse  72   04/16/22 14:31


 


Resp  18   04/16/22 14:31


 


BP  101/63   04/16/22 14:31


 


Pulse Ox  96   04/16/22 14:31








                                 Intake & Output











 04/15/22 04/16/22 04/16/22





 18:59 06:59 18:59


 


Intake Total 476  646


 


Balance 476  646


 


Intake:   


 


  Intake, IV Titration   410





  Amount   


 


    Sodium Chloride 0.9% 1,   160





    000 ml @ 20 mls/hr IV .   





    Q24H JOSELO Rx#:[id number]   


 


    Vancomycin 1,250 mg In   250





    Sodium Chloride 0.9% 250   





    ml @ 125 mls/hr IVPB Q24H   





    JOSELO Rx#:[id number]   


 


  Oral 476  236


 


Other:   


 


  # Voids 1 2 














- Exam


GENERAL DESCRIPTION: An elderly female lying in bed in no distress





RESPIRATORY SYSTEM: Unlabored breathing , decreased breath sounds at bases





HEART: S1 S2 regular rate and rhythm ,





ABDOMEN: Soft , no tenderness





EXTREMITIES: Left leg with minimal swelling and redness slightly warm to touch








- Labs


CBC & Chem 7: 


                                 04/16/22 06:24





                                 04/16/22 06:24


Labs: 


                  Abnormal Lab Results - Last 24 Hours (Table)











  04/15/22 04/15/22 04/16/22 Range/Units





  17:23 20:21 06:24 


 


WBC     (4.50-10.00)  X 10*3/uL


 


RBC     (4.10-5.20)  X 10*6/uL


 


Plt Count     (140-440)  X 10*3/uL


 


Sodium    136 L  (137-145)  mmol/L


 


BUN    20 H  (7-17)  mg/dL


 


Glucose    115 H  (74-99)  mg/dL


 


POC Glucose (mg/dL)  121 H  131 H   (75-99)  mg/dL


 


C-Reactive Protein    1.2 H  (<1.0)  mg/dL














  04/16/22 04/16/22 04/16/22 Range/Units





  06:24 07:13 12:10 


 


WBC  4.05 L    (4.50-10.00)  X 10*3/uL


 


RBC  4.08 L    (4.10-5.20)  X 10*6/uL


 


Plt Count  115 L    (140-440)  X 10*3/uL


 


Sodium     (137-145)  mmol/L


 


BUN     (7-17)  mg/dL


 


Glucose     (74-99)  mg/dL


 


POC Glucose (mg/dL)   113 H  100 H  (75-99)  mg/dL


 


C-Reactive Protein     (<1.0)  mg/dL








                      Microbiology - Last 24 Hours (Table)











 04/14/22 13:45 Blood Culture - Preliminary





 Blood    No Growth after 48 hours














Assessment and Plan


(1) Failure of outpatient treatment


Current Visit: Yes   Status: Acute   Code(s): Z78.9 - OTHER SPECIFIED HEALTH 

STATUS   SNOMED Code(s): 064441292


   





(2) Left leg cellulitis


Current Visit: Yes   Status: Acute   Code(s): L03.116 - CELLULITIS OF LEFT LOWER

LIMB   SNOMED Code(s): 526585841


   


Plan: 


1patient presented the hospital with left leg pain and swelling and is failing 

outpatient oral Bactrim and cephalexin concerning for cellulitis failing 

outpatient oral antibiotic therapy  more likely from gram-positive skin dank, 

clinically not behaving as an abscess


2-Marked area of the redness


3-Ace wrap to the leg to keep the swelling down


4-patient to continue with vancomycin pharmacy to dose target trough of 15 while

watching  kidney function and vancomycin trough closely


 


Time with Patient: Less than 30

## 2022-04-16 NOTE — P.PN
Subjective


Progress Note Date: 04/15/22





Patient is a pleasant 86 year old female that presented to the emergency room 

with persistent leg redness and swelling. Patient has been treated for 

cellulitis twice outpatient without resolution. Patient has a pertinent medical 

history of diet controlled diabetes, osteoarthritis, hypothyroidism, kidney s

tones, restless leg syndrome, and varicose veins. Ultrasound of the lower 

extremities was negative for DVT. BUN was 24 and creatinine was 1.26, above 

patient's baseline. Infectious disease was consulted for cellulitis. Blood 

cultures were ordered. White blood cell count was normal. Repeat in the AM.





4/15/2022





Patient is seen and evaluated in follow up today and is being closely monitored 

for left lower extremity cellulitis with failed outpatient. Patient reports to 

this being ongoing for the last few months. Patient with some continued swelling

and redness to the left and recommend ace wrapping the left lower extremity and 

elevating while at rest to assist with the swelling. Warm to the touch and 

continues with extreme sensitivity to palpation. Patient is on daily lasix oral 

and will continue. Patient continues on vanco and ID consulted. 





Review of systems:





Constitutional: Denies chills, Denies fever


Cardiovascular: Reports edema to the lower extremities, Denies chest pain, 

Denies shortness of breath


Respiratory: Denies dyspnea


Gastrointestinal: Denies abdominal pain, Denies nausea, Denies vomiting


Genitourinary: Denies dysuria, Denies pelvic pain


Musculoskeletal: Reports shooting left leg pain


Integumentary: Reports color changes, Reports continued swelling





Active Medications





Hydrocodone Bitart/Acetaminophen (Hydrocodone/Apap 5-325mg 1 Each Tab)  1 each 

PO Q6HR PRN


   PRN Reason: Pain


   Last Admin: 04/16/22 00:44 Dose:  1 each


   Documented by: 


Ascorbic Acid (Ascorbic Acid 500 Mg Tab)  500 mg PO BID [facility]


   Last Admin: 04/15/22 20:03 Dose:  500 mg


   Documented by: 


Furosemide (Furosemide 40 Mg Tab)  40 mg PO DAILY [facility]


   Last Admin: 04/15/22 08:48 Dose:  40 mg


   Documented by: 


Heparin Sodium (Porcine) (Heparin Sodium,Porcine/Pf 5,000 Unit/0.5 Ml Syringe)  

5,000 unit SQ Q12HR [facility]


   Last Admin: 04/15/22 20:03 Dose:  5,000 unit


   Documented by: 


Sodium Chloride (Saline 0.9%)  1,000 mls @ 20 mls/hr IV .Q24H [facility]


   Last Admin: 04/15/22 11:19 Dose:  50 mls/hr


   Documented by: 


Insulin Aspart (Insulin Aspart (Novolog) 100 Unit/Ml Vial)  0 unit SQ ACHS [facility]; 

Protocol


   Last Admin: 04/15/22 21:31 Dose:  Not Given


   Documented by: 


Levothyroxine Sodium (Levothyroxine 100 Mcg Tab)  100 mcg PO /BRKFST [facility]


   Last Admin: 04/15/22 08:49 Dose:  100 mcg


   Documented by: 


Lorazepam (Lorazepam 1 Mg Tab)  1 mg PO [facility]


   Last Admin: 04/15/22 20:03 Dose:  1 mg


   Documented by: 


Magnesium Oxide (Magnesium Oxide 400 Mg Tab)  400 mg PO QID [facility]


   Last Admin: 04/15/22 20:08 Dose:  400 mg


   Documented by: 


Meloxicam (Meloxicam 7.5 Mg Tab)  7.5 mg PO DAILY [facility]


   Last Admin: 04/15/22 08:50 Dose:  7.5 mg


   Documented by: 


Miscellaneous Information (Vancomycin Iv Per Pharmacy 1 Each [facility])  1 each MI

SCELLANE AS DIRECTED PRN; Protocol


   PRN Reason: Per Protocol


Multivitamins (Multivitamins, Thera 1 Each Tab)  1 each PO DAILY [facility]


   Last Admin: 04/15/22 08:49 Dose:  1 each


   Documented by: 


Naloxone HCl (Naloxone 0.4 Mg/Ml 1 Ml Vial)  0.2 mg IV Q2M PRN


   PRN Reason: Opioid Reversal


Ondansetron HCl (Ondansetron 4 Mg/2 Ml Vial)  4 mg IVP Q8HR PRN


   PRN Reason: Nausea And Vomiting


Pantoprazole Sodium ([facility]oprazole 40 Mg Tablet)  [facility]


   Last Admin: 04/15/22 08:49 Dose:  40 mg


   Documented by: 


Potassium Chloride (Potassium Chloride Er 20 Meq Tab.Er)  40 meq PO DAILY [facility]


   Last Admin: 04/15/22 08:48 Dose:  40 meq


   Documented by: 


Ropinirole HCl (Ropinirole Hcl 1 Mg Tab)  6 mg PO [facility]


   Last Admin: 04/15/22 20:03 Dose:  6 mg


   Documented by: 


Spironolactone (Spironolactone 25 Mg Tab)  12.5 mg PO DAILY [facility]


   Last Admin: 04/15/22 08:48 Dose:  12.5 mg


   Documented by: 





Physical exam:














Assessment:





Cellulitis of Left lower extremity with failure of outpatient therapy


Left leg pain and edema secondary to above


Acute kidney injury, secondary to dehydration, improving


Hypertension


Hyperlipidemia


Osteoarthritis


restless leg syndrome


Diet controlled diabetes


GI prophylaxis


DVT prophylaxis


No code





Plan: 





Infectious disease consulted and following for cellulitis treatment, vancomycin 

started and will continue with close monitoring of labs with repeat labs ordered


Normal saline ordered for hydration and will continue low dose, kidney functions

improved


Norco home dose ordered for pain management


Continue to monitor vital signs and left lower extremity for improvement. 

Patient reports to being on antibiotics over the last 3 months with continued 

redness and swelling. Skin is intact with no lesions or drainage noted. 

Recommend ace wrapping from toes to knee and elevating lower extremity while at 

rest. Area of concern marked to monitor for improvement. 


Encouraged oral intake and increased activity as tolerated. 


Further recommendations to come based on patients clinical course.





The impression and plan of care has been dictated by Kell Parekh, Nurse 

Practitioner as directed.





Dr. Laura MD


I have performed a history and examination and MDM of this patient, discussed 

the same with the dictator, and  agree with the dictator's assessment and plan 

as written ,documented as a scribe. Based on total visit time,  I have performed

more than 50% of the visit.  





Objective





- Vital Signs


Vital signs: 


                                   Vital Signs











Temp  97.5 F L  04/15/22 07:00


 


Pulse  67   04/15/22 07:00


 


Resp  18   04/15/22 07:00


 


BP  119/69   04/15/22 07:00


 


Pulse Ox  96   04/15/22 07:00








                                 Intake & Output











 04/14/22 04/15/22 04/15/22





 18:59 06:59 18:59


 


Weight 81.647 kg  


 


Other:   


 


  # Voids  2 0














- Labs


CBC & Chem 7: 


                                 04/15/22 07:31





                                 04/15/22 07:31


Labs: 


                  Abnormal Lab Results - Last 24 Hours (Table)











  04/14/22 04/14/22 04/14/22 Range/Units





  13:45 13:45 21:33 


 


Plt Count  140 L    (150-450)  k/uL


 


Carbon Dioxide   31 H   (22-30)  mmol/L


 


BUN   24 H   (7-17)  mg/dL


 


Creatinine   1.26 H   (0.52-1.04)  mg/dL


 


Glucose   115 H   (74-99)  mg/dL


 


POC Glucose (mg/dL)    133 H  (75-99)  mg/dL


 


Calcium   10.9 H   (8.4-10.2)  mg/dL


 


Alkaline Phosphatase   128 H   ()  U/L














  04/15/22 Range/Units





  07:31 


 


Plt Count   (150-450)  k/uL


 


Carbon Dioxide  32 H  (22-30)  mmol/L


 


BUN  22 H  (7-17)  mg/dL


 


Creatinine   (0.52-1.04)  mg/dL


 


Glucose  103 H  (74-99)  mg/dL


 


POC Glucose (mg/dL)   (75-99)  mg/dL


 


Calcium   (8.4-10.2)  mg/dL


 


Alkaline Phosphatase   ()  U/L

## 2022-04-17 LAB
ANION GAP SERPL CALC-SCNC: 3 MMOL/L
BUN SERPL-SCNC: 17 MG/DL (ref 7–17)
CALCIUM SPEC-MCNC: 9.3 MG/DL (ref 8.4–10.2)
CHLORIDE SERPL-SCNC: 102 MMOL/L (ref 98–107)
CO2 SERPL-SCNC: 30 MMOL/L (ref 22–30)
GLUCOSE BLD-MCNC: 108 MG/DL (ref 75–99)
GLUCOSE BLD-MCNC: 113 MG/DL (ref 75–99)
GLUCOSE BLD-MCNC: 83 MG/DL (ref 75–99)
GLUCOSE BLD-MCNC: 96 MG/DL (ref 75–99)
GLUCOSE SERPL-MCNC: 98 MG/DL (ref 74–99)
POTASSIUM SERPL-SCNC: 4 MMOL/L (ref 3.5–5.1)
SODIUM SERPL-SCNC: 135 MMOL/L (ref 137–145)

## 2022-04-17 RX ADMIN — INSULIN ASPART SCH: 100 INJECTION, SOLUTION INTRAVENOUS; SUBCUTANEOUS at 20:26

## 2022-04-17 RX ADMIN — Medication SCH MG: at 08:26

## 2022-04-17 RX ADMIN — SPIRONOLACTONE SCH MG: 25 TABLET, FILM COATED ORAL at 08:25

## 2022-04-17 RX ADMIN — PANTOPRAZOLE SODIUM SCH MG: 40 TABLET, DELAYED RELEASE ORAL at 08:25

## 2022-04-17 RX ADMIN — INSULIN ASPART SCH: 100 INJECTION, SOLUTION INTRAVENOUS; SUBCUTANEOUS at 12:53

## 2022-04-17 RX ADMIN — LEVOTHYROXINE SODIUM SCH MCG: 100 TABLET ORAL at 08:26

## 2022-04-17 RX ADMIN — OXYCODONE HYDROCHLORIDE AND ACETAMINOPHEN SCH MG: 500 TABLET ORAL at 20:32

## 2022-04-17 RX ADMIN — Medication SCH MG: at 20:32

## 2022-04-17 RX ADMIN — THERA TABS SCH EACH: TAB at 08:26

## 2022-04-17 RX ADMIN — Medication SCH MG: at 17:39

## 2022-04-17 RX ADMIN — SODIUM CHLORIDE SCH MLS/HR: 9 INJECTION, SOLUTION INTRAVENOUS at 11:28

## 2022-04-17 RX ADMIN — Medication SCH MG: at 13:34

## 2022-04-17 RX ADMIN — MELOXICAM SCH MG: 7.5 TABLET ORAL at 08:24

## 2022-04-17 RX ADMIN — HEPARIN SODIUM SCH UNIT: 5000 INJECTION INTRAVENOUS; SUBCUTANEOUS at 20:32

## 2022-04-17 RX ADMIN — HEPARIN SODIUM SCH UNIT: 5000 INJECTION INTRAVENOUS; SUBCUTANEOUS at 08:25

## 2022-04-17 RX ADMIN — HYDROCODONE BITARTRATE AND ACETAMINOPHEN PRN EACH: 5; 325 TABLET ORAL at 05:34

## 2022-04-17 RX ADMIN — INSULIN ASPART SCH: 100 INJECTION, SOLUTION INTRAVENOUS; SUBCUTANEOUS at 17:56

## 2022-04-17 RX ADMIN — INSULIN ASPART SCH: 100 INJECTION, SOLUTION INTRAVENOUS; SUBCUTANEOUS at 08:27

## 2022-04-17 RX ADMIN — POTASSIUM CHLORIDE SCH MEQ: 20 TABLET, EXTENDED RELEASE ORAL at 08:26

## 2022-04-17 RX ADMIN — OXYCODONE HYDROCHLORIDE AND ACETAMINOPHEN SCH MG: 500 TABLET ORAL at 08:25

## 2022-04-17 RX ADMIN — CEFAZOLIN SCH: 330 INJECTION, POWDER, FOR SOLUTION INTRAMUSCULAR; INTRAVENOUS at 05:21

## 2022-04-17 RX ADMIN — HYDROCODONE BITARTRATE AND ACETAMINOPHEN PRN EACH: 5; 325 TABLET ORAL at 21:04

## 2022-04-17 RX ADMIN — FUROSEMIDE SCH MG: 40 TABLET ORAL at 08:25

## 2022-04-17 NOTE — P.PN
Subjective


Progress Note Date: 04/17/22


Principal diagnosis: 


Left leg cellulitis





Patient is a 86-year-old  female presenting to the hospital with 

worsening pain swelling redness the left leg failing outpatient oral Bactrim and

cephalexin therapy.


On today's evaluation her that is 04/17/2022, the patient remains to be 

afebrile, the patient is still complaining of discomfort to the left leg, the 

patient denies chest pain shortness of breath or cough no abdominal pain and no 

diarrhea








Objective





- Vital Signs


Vital signs: 


                                   Vital Signs











Temp  97.6 F   04/17/22 15:11


 


Pulse  61   04/17/22 15:11


 


Resp  18   04/17/22 15:11


 


BP  96/60   04/17/22 15:11


 


Pulse Ox  97   04/17/22 15:11








                                 Intake & Output











 04/16/22 04/17/22 04/17/22





 18:59 06:59 18:59


 


Intake Total 886  240


 


Balance 886  240


 


Intake:   


 


  Intake, IV Titration 410  





  Amount   


 


    Sodium Chloride 0.9% 1, 160  





    000 ml @ 20 mls/hr IV .   





    Q24H [facility] Rx#:[id number]   


 


    Vancomycin 1,250 mg In 250  





    Sodium Chloride 0.9% 250   





    ml @ 125 mls/hr IVPB Q24H   





    [facility] Rx#:[id number]   


 


  Oral 476  240


 


Other:   


 


  # Voids  2 1


 


  # Bowel Movements   1














- Exam


GENERAL DESCRIPTION: An elderly female lying in bed in no distress





RESPIRATORY SYSTEM: Unlabored breathing , decreased breath sounds at bases





HEART: S1 S2 regular rate and rhythm ,





ABDOMEN: Soft , no tenderness





EXTREMITIES: Left leg with minimal swelling and redness slightly warm to touch








- Labs


CBC & Chem 7: 


                                 04/16/22 06:24





                                 04/17/22 06:10


Labs: 


                  Abnormal Lab Results - Last 24 Hours (Table)











  04/16/22 04/17/22 Range/Units





  17:07 06:10 


 


Sodium   135 L  (137-145)  mmol/L


 


POC Glucose (mg/dL)  113 H   (75-99)  mg/dL








                      Microbiology - Last 24 Hours (Table)











 04/14/22 13:45 Blood Culture - Preliminary





 Blood    No Growth after 72 hours














Assessment and Plan


(1) Failure of outpatient treatment


Current Visit: Yes   Status: Acute   Code(s): Z78.9 - OTHER SPECIFIED HEALTH 

STATUS   SNOMED Code(s): 071197156


   





(2) Left leg cellulitis


Current Visit: Yes   Status: Acute   Code(s): L03.116 - CELLULITIS OF LEFT LOWER

LIMB   SNOMED Code(s): 497442090


   


Plan: 


1patient presented the hospital with left leg pain and swelling and is failing 

outpatient oral Bactrim and cephalexin concerning for cellulitis failing 

outpatient oral antibiotic therapy  more likely from gram-positive skin dank, 

clinically not behaving as an abscess


2-Marked area of the redness


3-Ace wrap to the leg to keep the swelling down


4-patient to continue with vancomycin pharmacy to dose target trough of 15 while

inpatient, finishing therapy with doxycycline 100 mg twice a day for 7 days 

along with ACE wrapped to the leg to keep the swelling down

## 2022-04-18 LAB
ANION GAP SERPL CALC-SCNC: 5 MMOL/L
BASOPHILS # BLD AUTO: 0 K/UL (ref 0–0.2)
BASOPHILS NFR BLD AUTO: 1 %
BUN SERPL-SCNC: 17 MG/DL (ref 7–17)
CALCIUM SPEC-MCNC: 9.7 MG/DL (ref 8.4–10.2)
CHLORIDE SERPL-SCNC: 101 MMOL/L (ref 98–107)
CO2 SERPL-SCNC: 30 MMOL/L (ref 22–30)
EOSINOPHIL # BLD AUTO: 0.2 K/UL (ref 0–0.7)
EOSINOPHIL NFR BLD AUTO: 8 %
ERYTHROCYTE [DISTWIDTH] IN BLOOD BY AUTOMATED COUNT: 4.14 M/UL (ref 3.8–5.4)
ERYTHROCYTE [DISTWIDTH] IN BLOOD: 13.3 % (ref 11.5–15.5)
GLUCOSE BLD-MCNC: 114 MG/DL (ref 75–99)
GLUCOSE SERPL-MCNC: 94 MG/DL (ref 74–99)
HCT VFR BLD AUTO: 38.9 % (ref 34–46)
HGB BLD-MCNC: 12.5 GM/DL (ref 11.4–16)
LYMPHOCYTES # SPEC AUTO: 1.1 K/UL (ref 1–4.8)
LYMPHOCYTES NFR SPEC AUTO: 36 %
MCH RBC QN AUTO: 30.1 PG (ref 25–35)
MCHC RBC AUTO-ENTMCNC: 32 G/DL (ref 31–37)
MCV RBC AUTO: 94 FL (ref 80–100)
MONOCYTES # BLD AUTO: 0.3 K/UL (ref 0–1)
MONOCYTES NFR BLD AUTO: 9 %
NEUTROPHILS # BLD AUTO: 1.3 K/UL (ref 1.3–7.7)
NEUTROPHILS NFR BLD AUTO: 44 %
PLATELET # BLD AUTO: 132 K/UL (ref 150–450)
POTASSIUM SERPL-SCNC: 4.1 MMOL/L (ref 3.5–5.1)
SODIUM SERPL-SCNC: 136 MMOL/L (ref 137–145)
WBC # BLD AUTO: 3 K/UL (ref 3.8–10.6)

## 2022-04-18 RX ADMIN — Medication SCH MG: at 08:23

## 2022-04-18 RX ADMIN — SPIRONOLACTONE SCH MG: 25 TABLET, FILM COATED ORAL at 08:23

## 2022-04-18 RX ADMIN — HEPARIN SODIUM SCH UNIT: 5000 INJECTION INTRAVENOUS; SUBCUTANEOUS at 08:24

## 2022-04-18 RX ADMIN — LEVOTHYROXINE SODIUM SCH MCG: 100 TABLET ORAL at 08:23

## 2022-04-18 RX ADMIN — HYDROCODONE BITARTRATE AND ACETAMINOPHEN PRN EACH: 10; 325 TABLET ORAL at 11:34

## 2022-04-18 RX ADMIN — HYDROCODONE BITARTRATE AND ACETAMINOPHEN PRN EACH: 5; 325 TABLET ORAL at 06:08

## 2022-04-18 RX ADMIN — HEPARIN SODIUM SCH UNIT: 5000 INJECTION INTRAVENOUS; SUBCUTANEOUS at 20:23

## 2022-04-18 RX ADMIN — INSULIN ASPART SCH: 100 INJECTION, SOLUTION INTRAVENOUS; SUBCUTANEOUS at 12:56

## 2022-04-18 RX ADMIN — CEFAZOLIN SCH: 330 INJECTION, POWDER, FOR SOLUTION INTRAMUSCULAR; INTRAVENOUS at 19:59

## 2022-04-18 RX ADMIN — INSULIN ASPART SCH: 100 INJECTION, SOLUTION INTRAVENOUS; SUBCUTANEOUS at 21:22

## 2022-04-18 RX ADMIN — CEFAZOLIN SCH: 330 INJECTION, POWDER, FOR SOLUTION INTRAMUSCULAR; INTRAVENOUS at 00:49

## 2022-04-18 RX ADMIN — Medication SCH MG: at 20:23

## 2022-04-18 RX ADMIN — PANTOPRAZOLE SODIUM SCH MG: 40 TABLET, DELAYED RELEASE ORAL at 08:23

## 2022-04-18 RX ADMIN — Medication SCH MG: at 17:17

## 2022-04-18 RX ADMIN — OXYCODONE HYDROCHLORIDE AND ACETAMINOPHEN SCH MG: 500 TABLET ORAL at 08:23

## 2022-04-18 RX ADMIN — MELOXICAM SCH MG: 7.5 TABLET ORAL at 08:23

## 2022-04-18 RX ADMIN — SODIUM CHLORIDE SCH MLS/HR: 9 INJECTION, SOLUTION INTRAVENOUS at 11:34

## 2022-04-18 RX ADMIN — INSULIN ASPART SCH: 100 INJECTION, SOLUTION INTRAVENOUS; SUBCUTANEOUS at 17:10

## 2022-04-18 RX ADMIN — FUROSEMIDE SCH MG: 40 TABLET ORAL at 08:24

## 2022-04-18 RX ADMIN — HYDROCODONE BITARTRATE AND ACETAMINOPHEN PRN EACH: 10; 325 TABLET ORAL at 19:22

## 2022-04-18 RX ADMIN — Medication SCH MG: at 13:09

## 2022-04-18 RX ADMIN — INSULIN ASPART SCH: 100 INJECTION, SOLUTION INTRAVENOUS; SUBCUTANEOUS at 07:39

## 2022-04-18 RX ADMIN — THERA TABS SCH EACH: TAB at 08:24

## 2022-04-18 RX ADMIN — OXYCODONE HYDROCHLORIDE AND ACETAMINOPHEN SCH MG: 500 TABLET ORAL at 20:23

## 2022-04-18 RX ADMIN — POTASSIUM CHLORIDE SCH MEQ: 20 TABLET, EXTENDED RELEASE ORAL at 08:23

## 2022-04-18 NOTE — P.PN
Subjective


Progress Note Date: 04/17/22





Patient is a pleasant 86 year old female that presented to the emergency room 

with persistent leg redness and swelling. Patient has been treated for 

cellulitis twice outpatient without resolution. Patient has a pertinent medical 

history of diet controlled diabetes, osteoarthritis, hypothyroidism, kidney s

tones, restless leg syndrome, and varicose veins. Ultrasound of the lower 

extremities was negative for DVT. BUN was 24 and creatinine was 1.26, above 

patient's baseline. Infectious disease was consulted for cellulitis. Blood 

cultures were ordered. White blood cell count was normal. Repeat in the AM.





4/15/2022





Patient is seen and evaluated in follow up today and is being closely monitored 

for left lower extremity cellulitis with failed outpatient. Patient reports to 

this being ongoing for the last few months. Patient with some continued swelling

and redness to the left and recommend ace wrapping the left lower extremity and 

elevating while at rest to assist with the swelling. Warm to the touch and 

continues with extreme sensitivity to palpation. Patient is on daily lasix oral 

and will continue. Patient continues on vanco and ID consulted. 





4/16/2022


Patient is currently resting in bed.  Still complains of left lower extremity 

pain and requesting pain medications.  Left lower extremity redness below the 

knee up to the ankle and warm to touch..  Swelling is improving.  No skin breakd

own for line discharge.  Patient is also on oral Lasix.  Currently on 

antibiotics above vancomycin.  ID is on board.





4/17/2022


Patient is currently resting in bed.  Complains of pain.  No fever no chills.  

Patient has been afebrile.  Leg swelling and redness is improving.  Ace wrap 

overnight.  No complaints of chest pain or shortness of breath.  No nausea 

vomiting abdominal pain or diarrhea.  Laboratory data showed sodium 133 

potassium 4.3 chloride 102 bicarb is 30 BUN 17 and creatinine 0.91 and calcium 

9.3.








Review of systems:





Constitutional: Denies chills, Denies fever


Cardiovascular: Reports edema to the lower extremities, Denies chest pain, 

Denies shortness of breath


Respiratory: Denies dyspnea


Gastrointestinal: Denies abdominal pain, Denies nausea, Denies vomiting


Genitourinary: Denies dysuria, Denies pelvic pain


Musculoskeletal: Reports shooting left leg pain


Integumentary: Reports color changes, Reports continued swelling








Objective





- Vital Signs


Vital signs: 


                                   Vital Signs











Temp  97.8 F   04/17/22 07:48


 


Pulse  64   04/17/22 07:48


 


Resp  18   04/17/22 07:48


 


BP  107/70   04/17/22 07:48


 


Pulse Ox  97   04/17/22 07:48








                                 Intake & Output











 04/16/22 04/17/22 04/17/22





 18:59 06:59 18:59


 


Intake Total 886  240


 


Balance 886  240


 


Intake:   


 


  Intake, IV Titration 410  





  Amount   


 


    Sodium Chloride 0.9% 1, 160  





    000 ml @ 20 mls/hr IV .   





    Q24H [facility] Rx#:[id number]   


 


    Vancomycin 1,250 mg In 250  





    Sodium Chloride 0.9% 250   





    ml @ 125 mls/hr IVPB Q24H   





    [facility] Rx#:[id number]   


 


  Oral 476  240


 


Other:   


 


  # Voids  2 














- Exam





PHYSICAL EXAMINATION: 


Patient is lying in the bed comfortably, mild distress and restless of the left 

lower extremity, awake alert and oriented.. 


HEENT: Normocephalic. Neck is supple. Pupils reactive. Nostrils clear. Oral 

cavity is moist. 


Neck: reveals no JVD, carotid bruits, or thyromegaly. 


CHEST EXAMINATION: Trachea is central. Symmetrical expansion. Normal S1, S2 with

no gallops. No murmurs 


Respiratory: diminished breath sounds bilaterally with no  wheezing or rhonchi 

noted.  Non-labored breathing.


ABDOMEN: Soft. Bowel sounds normal. No organomegaly. No abdominal bruits. 


Extremities: left lower extremity edema with redness and swelling noted.   No 

clubbing or cyanosis


Neurologically awake, alert, oriented x3 with well-coordinated movements.  No 

focal deficits noted


Skin: No rash or skin lesions. except for mentioned above


Psychiatric: Cooperative.  Non-suicidal  


Musculoskeletal: No joint swelling or deformity.  Normal range of motion.





- Labs


CBC & Chem 7: 


                                 04/16/22 06:24





                                 04/17/22 06:10


Labs: 


                  Abnormal Lab Results - Last 24 Hours (Table)











  04/16/22 04/16/22 04/17/22 Range/Units





  12:10 17:07 06:10 


 


Sodium    135 L  (137-145)  mmol/L


 


POC Glucose (mg/dL)  100 H  113 H   (75-99)  mg/dL








                      Microbiology - Last 24 Hours (Table)











 04/14/22 13:45 Blood Culture - Preliminary





 Blood    No Growth after 48 hours














Assessment and Plan


Assessment: 





Cellulitis of Left lower extremity with failure of outpatient therapy


Left leg pain and edema secondary to above


Acute kidney injury, secondary to dehydration, improving


Hypertension


Hyperlipidemia


Osteoarthritis


restless leg syndrome


Diet controlled diabetes


GI prophylaxis


DVT prophylaxis


No code





Plan: 





Infectious disease consulted and following for cellulitis treatment, vancomycin 

started and will continue with close monitoring of labs with repeat labs ordered


Normal saline ordered for hydration and will continue low dose, kidney functions

improved


Norco home dose ordered for pain management


Continue to monitor vital signs and left lower extremity for improvement. 

Patient reports to being on antibiotics over the last 3 months with continued 

redness and swelling. Skin is intact with no lesions or drainage noted. 

Recommend ace wrapping from toes to knee and elevating lower extremity while at 

rest. Area of concern marked to monitor for improvement. 


Encouraged oral intake and increased activity as tolerated. 


Further recommendations to come based on patients clinical course.

## 2022-04-18 NOTE — P.PN
Subjective


Progress Note Date: 04/18/22


Principal diagnosis: 


Left leg cellulitis





Patient is a 86-year-old  female presenting to the hospital with 

worsening pain swelling redness the left leg failing outpatient oral Bactrim and

cephalexin therapy.


On today's evaluation her that is 04/18/2022, the patient is afebrile, the 

patient discomfort to the left leg is slightly decreased in intensity, the 

patient denies chest pain shortness of breath or cough no abdominal pain and no 

diarrhea








Objective





- Vital Signs


Vital signs: 


                                   Vital Signs











Temp  97.9 F   04/18/22 07:34


 


Pulse  64   04/18/22 07:34


 


Resp  18   04/18/22 07:34


 


BP  118/68   04/18/22 07:34


 


Pulse Ox  97   04/18/22 07:34








                                 Intake & Output











 04/17/22 04/18/22 04/18/22





 18:59 06:59 18:59


 


Intake Total 480  240


 


Balance 480  240


 


Intake:   


 


  Oral 480  240


 


Other:   


 


  # Voids 2 2 1


 


  # Bowel Movements 1  














- Exam


GENERAL DESCRIPTION: An elderly female lying in bed in no distress





RESPIRATORY SYSTEM: Unlabored breathing , decreased breath sounds at bases





HEART: S1 S2 regular rate and rhythm ,





ABDOMEN: Soft , no tenderness





EXTREMITIES: Left leg with minimal swelling and redness slightly warm to touch








- Labs


CBC & Chem 7: 


                                 04/18/22 10:09





                                 04/18/22 10:09


Labs: 


                  Abnormal Lab Results - Last 24 Hours (Table)











  04/17/22 04/17/22 04/18/22 Range/Units





  17:11 20:17 10:09 


 


WBC     (3.8-10.6)  k/uL


 


Plt Count     (150-450)  k/uL


 


Sodium    136 L  (137-145)  mmol/L


 


POC Glucose (mg/dL)  108 H  113 H   (75-99)  mg/dL














  04/18/22 Range/Units





  10:09 


 


WBC  3.0 L  (3.8-10.6)  k/uL


 


Plt Count  132 L  (150-450)  k/uL


 


Sodium   (137-145)  mmol/L


 


POC Glucose (mg/dL)   (75-99)  mg/dL








                      Microbiology - Last 24 Hours (Table)











 04/14/22 13:45 Blood Culture - Preliminary





 Blood    No Growth after 72 hours














Assessment and Plan


(1) Failure of outpatient treatment


Current Visit: Yes   Status: Acute   Code(s): Z78.9 - OTHER SPECIFIED HEALTH 

STATUS   SNOMED Code(s): 261143083


   





(2) Left leg cellulitis


Current Visit: Yes   Status: Acute   Code(s): L03.116 - CELLULITIS OF LEFT LOWER

LIMB   SNOMED Code(s): 519578175


   


Plan: 


1patient presented the hospital with left leg pain and swelling and is failing 

outpatient oral Bactrim and cephalexin concerning for cellulitis failing 

outpatient oral antibiotic therapy  more likely from gram-positive skin dank, 

clinically not behaving as an abscess


2-Marked area of the redness


3-Ace wrap to the leg to keep the swelling down


4-patient is currently being treated with vancomycin pharmacy to dose target tr

ough of 15 while inpatient, however the patient be able to finish therapy with 

doxycycline 100 mg twice a day for 7 days along with ACE wrapped to the leg to 

keep the swelling down


Time with Patient: Less than 30

## 2022-04-18 NOTE — P.PN
Subjective


Progress Note Date: 04/16/22





Patient is a pleasant 86 year old female that presented to the emergency room 

with persistent leg redness and swelling. Patient has been treated for 

cellulitis twice outpatient without resolution. Patient has a pertinent medical 

history of diet controlled diabetes, osteoarthritis, hypothyroidism, kidney s

tones, restless leg syndrome, and varicose veins. Ultrasound of the lower 

extremities was negative for DVT. BUN was 24 and creatinine was 1.26, above 

patient's baseline. Infectious disease was consulted for cellulitis. Blood 

cultures were ordered. White blood cell count was normal. Repeat in the AM.





4/15/2022





Patient is seen and evaluated in follow up today and is being closely monitored 

for left lower extremity cellulitis with failed outpatient. Patient reports to 

this being ongoing for the last few months. Patient with some continued swelling

and redness to the left and recommend ace wrapping the left lower extremity and 

elevating while at rest to assist with the swelling. Warm to the touch and 

continues with extreme sensitivity to palpation. Patient is on daily lasix oral 

and will continue. Patient continues on vanco and ID consulted. 





4/16/2022


Patient is currently resting in bed.  Still complains of left lower extremity 

pain and requesting pain medications.  Left lower extremity redness below the 

knee up to the ankle and warm to touch..  Swelling is improving.  No skin breakd

own for line discharge.  Patient is also on oral Lasix.  Currently on 

antibiotics above vancomycin.  ID is on board.





Review of systems:





Constitutional: Denies chills, Denies fever


Cardiovascular: Reports edema to the lower extremities, Denies chest pain, 

Denies shortness of breath


Respiratory: Denies dyspnea


Gastrointestinal: Denies abdominal pain, Denies nausea, Denies vomiting


Genitourinary: Denies dysuria, Denies pelvic pain


Musculoskeletal: Reports shooting left leg pain


Integumentary: Reports color changes, Reports continued swelling








Objective





- Vital Signs


Vital signs: 


                                   Vital Signs











Temp  98.3 F   04/16/22 14:31


 


Pulse  72   04/16/22 14:31


 


Resp  18   04/16/22 14:31


 


BP  101/63   04/16/22 14:31


 


Pulse Ox  96   04/16/22 14:31








                                 Intake & Output











 04/15/22 04/16/22 04/16/22





 18:59 06:59 18:59


 


Intake Total 476  646


 


Balance 476  646


 


Intake:   


 


  Intake, IV Titration   410





  Amount   


 


    Sodium Chloride 0.9% 1,   160





    000 ml @ 20 mls/hr IV .   





    Q24H [facility] Rx#:[id number]   


 


    Vancomycin 1,250 mg In   250





    Sodium Chloride 0.9% 250   





    ml @ 125 mls/hr IVPB Q24H   





    [facility] Rx#:[id number]   


 


  Oral 476  236


 


Other:   


 


  # Voids 1 2 














- Exam





PHYSICAL EXAMINATION: 


Patient is lying in the bed comfortably, mild distress and restless of the left 

lower extremity, awake alert and oriented.. 


HEENT: Normocephalic. Neck is supple. Pupils reactive. Nostrils clear. Oral cav

ity is moist. 


Neck: reveals no JVD, carotid bruits, or thyromegaly. 


CHEST EXAMINATION: Trachea is central. Symmetrical expansion. Normal S1, S2 with

no gallops. No murmurs 


Respiratory: diminished breath sounds bilaterally with no  wheezing or rhonchi 

noted.  Non-labored breathing.


ABDOMEN: Soft. Bowel sounds normal. No organomegaly. No abdominal bruits. 


Extremities: left lower extremity edema with redness and swelling noted.   No 

clubbing or cyanosis


Neurologically awake, alert, oriented x3 with well-coordinated movements.  No 

focal deficits noted


Skin: No rash or skin lesions. except for mentioned above


Psychiatric: Cooperative.  Non-suicidal  


Musculoskeletal: No joint swelling or deformity.  Normal range of motion.





- Labs


CBC & Chem 7: 


                                 04/16/22 06:24





                                 04/17/22 06:10


Labs: 


                  Abnormal Lab Results - Last 24 Hours (Table)











  04/15/22 04/15/22 04/16/22 Range/Units





  17:23 20:21 06:24 


 


WBC     (4.50-10.00)  X 10*3/uL


 


RBC     (4.10-5.20)  X 10*6/uL


 


Plt Count     (140-440)  X 10*3/uL


 


Sodium    136 L  (137-145)  mmol/L


 


BUN    20 H  (7-17)  mg/dL


 


Glucose    115 H  (74-99)  mg/dL


 


POC Glucose (mg/dL)  121 H  131 H   (75-99)  mg/dL


 


C-Reactive Protein    1.2 H  (<1.0)  mg/dL














  04/16/22 04/16/22 04/16/22 Range/Units





  06:24 07:13 12:10 


 


WBC  4.05 L    (4.50-10.00)  X 10*3/uL


 


RBC  4.08 L    (4.10-5.20)  X 10*6/uL


 


Plt Count  115 L    (140-440)  X 10*3/uL


 


Sodium     (137-145)  mmol/L


 


BUN     (7-17)  mg/dL


 


Glucose     (74-99)  mg/dL


 


POC Glucose (mg/dL)   113 H  100 H  (75-99)  mg/dL


 


C-Reactive Protein     (<1.0)  mg/dL








                      Microbiology - Last 24 Hours (Table)











 04/14/22 13:45 Blood Culture - Preliminary





 Blood    No Growth after 24 hours














Assessment and Plan


Assessment: 





Cellulitis of Left lower extremity with failure of outpatient therapy


Left leg pain and edema secondary to above


Acute kidney injury, secondary to dehydration, improving


Hypertension


Hyperlipidemia


Osteoarthritis


restless leg syndrome


Diet controlled diabetes


GI prophylaxis


DVT prophylaxis


No code





Plan: 





Infectious disease consulted and following for cellulitis treatment, vancomycin 

started and will continue with close monitoring of labs with repeat labs ordered


Normal saline ordered for hydration and will continue low dose, kidney functions

improved


Norco home dose ordered for pain management


Continue to monitor vital signs and left lower extremity for improvement. 

Patient reports to being on antibiotics over the last 3 months with continued 

redness and swelling. Skin is intact with no lesions or drainage noted. 

Recommend ace wrapping from toes to knee and elevating lower extremity while at 

rest. Area of concern marked to monitor for improvement. 


Encouraged oral intake and increased activity as tolerated. 


Further recommendations to come based on patients clinical course.





Time with Patient: Greater than 30

## 2022-04-19 VITALS — TEMPERATURE: 98.3 F | HEART RATE: 69 BPM | SYSTOLIC BLOOD PRESSURE: 124 MMHG | DIASTOLIC BLOOD PRESSURE: 70 MMHG

## 2022-04-19 VITALS — RESPIRATION RATE: 18 BRPM

## 2022-04-19 LAB
GLUCOSE BLD-MCNC: 70 MG/DL (ref 75–99)
GLUCOSE BLD-MCNC: 84 MG/DL (ref 75–99)

## 2022-04-19 RX ADMIN — HYDROCODONE BITARTRATE AND ACETAMINOPHEN PRN EACH: 10; 325 TABLET ORAL at 02:25

## 2022-04-19 RX ADMIN — INSULIN ASPART SCH: 100 INJECTION, SOLUTION INTRAVENOUS; SUBCUTANEOUS at 08:04

## 2022-04-19 RX ADMIN — SODIUM CHLORIDE SCH MLS/HR: 9 INJECTION, SOLUTION INTRAVENOUS at 12:27

## 2022-04-19 RX ADMIN — Medication SCH MG: at 08:13

## 2022-04-19 RX ADMIN — FUROSEMIDE SCH MG: 40 TABLET ORAL at 08:13

## 2022-04-19 RX ADMIN — OXYCODONE HYDROCHLORIDE AND ACETAMINOPHEN SCH MG: 500 TABLET ORAL at 08:13

## 2022-04-19 RX ADMIN — MELOXICAM SCH MG: 7.5 TABLET ORAL at 08:13

## 2022-04-19 RX ADMIN — THERA TABS SCH EACH: TAB at 08:13

## 2022-04-19 RX ADMIN — POTASSIUM CHLORIDE SCH MEQ: 20 TABLET, EXTENDED RELEASE ORAL at 08:12

## 2022-04-19 RX ADMIN — Medication SCH MG: at 14:15

## 2022-04-19 RX ADMIN — LEVOTHYROXINE SODIUM SCH MCG: 100 TABLET ORAL at 08:13

## 2022-04-19 RX ADMIN — SPIRONOLACTONE SCH MG: 25 TABLET, FILM COATED ORAL at 08:13

## 2022-04-19 RX ADMIN — INSULIN ASPART SCH: 100 INJECTION, SOLUTION INTRAVENOUS; SUBCUTANEOUS at 12:29

## 2022-04-19 RX ADMIN — PANTOPRAZOLE SODIUM SCH MG: 40 TABLET, DELAYED RELEASE ORAL at 08:13

## 2022-04-19 RX ADMIN — HEPARIN SODIUM SCH UNIT: 5000 INJECTION INTRAVENOUS; SUBCUTANEOUS at 08:12

## 2022-04-19 NOTE — P.PN
Subjective


Progress Note Date: 04/18/22





Patient is a pleasant 86 year old female that presented to the emergency room 

with persistent leg redness and swelling. Patient has been treated for 

cellulitis twice outpatient without resolution. Patient has a pertinent medical 

history of diet controlled diabetes, osteoarthritis, hypothyroidism, kidney s

tones, restless leg syndrome, and varicose veins. Ultrasound of the lower 

extremities was negative for DVT. BUN was 24 and creatinine was 1.26, above 

patient's baseline. Infectious disease was consulted for cellulitis. Blood 

cultures were ordered. White blood cell count was normal. Repeat in the AM.





4/15/2022





Patient is seen and evaluated in follow up today and is being closely monitored 

for left lower extremity cellulitis with failed outpatient. Patient reports to 

this being ongoing for the last few months. Patient with some continued swelling

and redness to the left and recommend ace wrapping the left lower extremity and 

elevating while at rest to assist with the swelling. Warm to the touch and 

continues with extreme sensitivity to palpation. Patient is on daily lasix oral 

and will continue. Patient continues on vanco and ID consulted. 





4/16/2022


Patient is currently resting in bed.  Still complains of left lower extremity 

pain and requesting pain medications.  Left lower extremity redness below the 

knee up to the ankle and warm to touch..  Swelling is improving.  No skin breakd

own for line discharge.  Patient is also on oral Lasix.  Currently on 

antibiotics above vancomycin.  ID is on board.





4/17/2022


Patient is currently resting in bed.  Complains of pain.  No fever no chills.  

Patient has been afebrile.  Leg swelling and redness is improving.  Ace wrap 

overnight.  No complaints of chest pain or shortness of breath.  No nausea 

vomiting abdominal pain or diarrhea.  Laboratory data showed sodium 133 

potassium 4.3 chloride 102 bicarb is 30 BUN 17 and creatinine 0.91 and calcium 

9.3.





04/18/2022


Patient is seen this morning continues to be in extreme 10/10 pain and re

questing more Norco more frequently and also has been instructed and educated on

wearing Ace wrap although reports to having increasing pain and intolerance to 

ace wrap.  ID following and patient is maintained on IV antibiotics in the form 

of vancomycin and will continue and will likely transition to oral doxycycline 

on discharge. Patient is afebrile and denies any chest pain or palpitations. 

Patient denies shortness of breath. Will adjust pain medications. Encouraged 

increased activity as tolerated. Swelling appears somewhat improved of the left 

lower extremity. 








Review of systems:





Constitutional: Denies chills, Denies fever


Cardiovascular: Reports edema to the lower extremities, Denies chest pain, 

Denies shortness of breath


Respiratory: Denies dyspnea


Gastrointestinal: Denies abdominal pain, Denies nausea, Denies vomiting


Genitourinary: Denies dysuria, Denies pelvic pain


Musculoskeletal: Reports continued shooting left leg pain


Integumentary: Reports color changes, Reports continued swelling





Active Medications





Hydrocodone Bitart/Acetaminophen (Hydrocodone/Apap 10-325mg 1 Each Tab)  1 each 

PO Q4H PRN


   PRN Reason: Moderate Pain


   Last Admin: 04/18/22 11:34 Dose:  1 each


   Documented by: 


Ascorbic Acid (Ascorbic Acid 500 Mg Tab)  500 mg PO BID [facility]


   Last Admin: 04/18/22 08:23 Dose:  500 mg


   Documented by: 


Furosemide (Furosemide 40 Mg Tab)  40 mg PO DAILY [facility]


   Last Admin: 04/18/22 08:24 Dose:  40 mg


   Documented by: 


Heparin Sodium (Porcine) (Heparin Sodium,Porcine/Pf 5,000 Unit/0.5 Ml Syringe)  

5,000 unit SQ Q12HR [facility]


   Last Admin: 04/18/22 08:24 Dose:  5,000 unit


   Documented by: 


Sodium Chloride (Saline 0.9%)  1,000 mls @ 20 mls/hr IV .Q24H [facility]


   Last Admin: 04/18/22 00:49 Dose:  Not Given


   Documented by: 


Vancomycin HCl 1,250 mg/ (Sodium Chloride)  250 mls @ 125 mls/hr IVPB Q24H [facility]


   Last Admin: 04/18/22 11:34 Dose:  125 mls/hr


   Documented by: 


Insulin Aspart (Insulin Aspart (Novolog) 100 Unit/Ml Vial)  0 unit SQ ACHS [facility]; 

Protocol


   Last Admin: 04/18/22 12:56 Dose:  Not Given


   Documented by: 


Levothyroxine Sodium (Levothyroxine 100 Mcg Tab)  100 mcg PO W/BRKFST [facility]


   Last Admin: 04/18/22 08:23 Dose:  100 mcg


   Documented by: 


Lorazepam (Lorazepam 1 Mg Tab)  1 mg PO HS [facility]


   Last Admin: 04/17/22 20:32 Dose:  1 mg


   Documented by: 


Magnesium Oxide (Magnesium Oxide 400 Mg Tab)  400 mg PO QID [facility]


   Last Admin: 04/18/22 13:09 Dose:  400 mg


   Documented by: 


Meloxicam (Meloxicam 7.5 Mg Tab)  7.5 mg PO DAILY [facility]


   Last Admin: 04/18/22 08:23 Dose:  7.5 mg


   Documented by: 


Multivitamins (Multivitamins, Thera 1 Each Tab)  1 each PO DAILY [facility]


   Last Admin: 04/18/22 08:24 Dose:  1 each


   Documented by: 


Naloxone HCl (Naloxone 0.4 Mg/Ml 1 Ml Vial)  0.2 mg IV Q2M PRN


   PRN Reason: Opioid Reversal


Ondansetron HCl (Ondansetron 4 Mg/2 Ml Vial)  4 mg IVP Q8HR PRN


   PRN Reason: Nausea And Vomiting


Pantoprazole Sodium (Pantoprazole 40 Mg Tablet)  40 mg PO AC-BRKFST [facility]


   Last Admin: 04/18/22 08:23 Dose:  40 mg


   Documented by: 


Potassium Chloride (Potassium Chloride Er 20 Meq Tab.Er)  40 meq PO DAILY [facility]


   Last Admin: 04/18/22 08:23 Dose:  40 meq


   Documented by: 


Ropinirole HCl (Ropinirole Hcl 1 Mg Tab)  6 mg PO HS [facility]


   Last Admin: 04/17/22 20:32 Dose:  6 mg


   Documented by: 


Spironolactone (Spironolactone 25 Mg Tab)  12.5 mg PO DAILY [facility]


   Last Admin: 04/18/22 08:23 Dose:  12.5 mg


   Documented by: 











Physical exam:





Patient is lying in the bed comfortably, mild distress and restless of the left 

lower extremity, awake alert and oriented.. 


HEENT: Normocephalic. Neck is supple. Pupils reactive. Nostrils clear. Oral 

cavity is moist. 


Neck: reveals no JVD, carotid bruits, or thyromegaly. 


CHEST EXAMINATION: Trachea is central. Symmetrical expansion. Normal S1, S2 with

no gallops. No murmurs 


Respiratory: diminished breath sounds bilaterally with no  wheezing or rhonchi 

noted.  Non-labored breathing.


ABDOMEN: Soft. Bowel sounds normal. No organomegaly. No abdominal bruits. 


Extremities: left lower extremity edema with redness and swelling noted. Some 

improvement in the swelling noted on exam.   No clubbing or cyanosis


Neurologically awake, alert, oriented x3 with well-coordinated movements.  No 

focal deficits noted


Skin: No rash or skin lesions. except for mentioned above


Psychiatric: Cooperative.  Non-suicidal  


Musculoskeletal: No joint swelling or deformity.  Normal range of motion.











Assessment:





Cellulitis of Left lower extremity with failure of outpatient therapy


Left leg pain and edema secondary to above


Acute kidney injury, secondary to dehydration, improved


Hypertension


Hyperlipidemia


Osteoarthritis


restless leg syndrome


Diet controlled diabetes


GI prophylaxis


DVT prophylaxis


No code





Plan: 





Infectious disease  following for cellulitis treatment and continued on 

vancomycin and will transition to a one week course of doxycycline twice daily 

on discharge. 


Norco rdered for pain management and have increased the dose as patient 

continues to report 10-10 pain.


Continue to monitor vital signs and left lower extremity for improvement. 

Recommend ace wrapping from toes to knee and elevating lower extremity while at 

rest. Patient reports to not tolerating the ace wrap. Encouraged ace wrap and 

discussed with nursing staff and patient agreeable. 


Encouraged oral intake and increased activity as tolerated. 


Recommend pain management and close monitoring for 24 hours and likely discharge

in 24 hours. 





The impression and plan of care has been dictated by Kell Parekh, Nurse 

Practitioner as directed.





Dr. Laura MD


I have performed a history and examination and MDM of this patient, discussed 

the same with the dictator, and  agree with the dictator's assessment and plan 

as written ,documented as a scribe. Based on total visit time,  I have performed

more than 50% of the visit.  





Objective





- Vital Signs


Vital signs: 


                                   Vital Signs











Temp  97.9 F   04/18/22 07:34


 


Pulse  64   04/18/22 07:34


 


Resp  18   04/18/22 07:34


 


BP  118/68   04/18/22 07:34


 


Pulse Ox  97   04/18/22 07:34








                                 Intake & Output











 04/17/22 04/18/22 04/18/22





 18:59 06:59 18:59


 


Intake Total 480  240


 


Balance 480  240


 


Intake:   


 


  Oral 480  240


 


Other:   


 


  # Voids 2 2 1


 


  # Bowel Movements 1  














- Labs


CBC & Chem 7: 


                                 04/18/22 10:09





                                 04/18/22 10:09


Labs: 


                  Abnormal Lab Results - Last 24 Hours (Table)











  04/17/22 04/17/22 04/18/22 Range/Units





  17:11 20:17 10:09 


 


WBC     (3.8-10.6)  k/uL


 


Plt Count     (150-450)  k/uL


 


Sodium    136 L  (137-145)  mmol/L


 


POC Glucose (mg/dL)  108 H  113 H   (75-99)  mg/dL














  04/18/22 Range/Units





  10:09 


 


WBC  3.0 L  (3.8-10.6)  k/uL


 


Plt Count  132 L  (150-450)  k/uL


 


Sodium   (137-145)  mmol/L


 


POC Glucose (mg/dL)   (75-99)  mg/dL








                      Microbiology - Last 24 Hours (Table)











 04/14/22 13:45 Blood Culture - Preliminary





 Blood    No Growth after 72 hours

## 2022-04-19 NOTE — P.PN
Subjective


Progress Note Date: 04/19/22


Principal diagnosis: 





left lower leg cellulitis





Patient is a pleasant 86 year old female that presented to the emergency room 

with persistent leg redness and swelling. Patient has been treated for 

cellulitis twice outpatient without resolution. Patient has a pertinent medical 

history of diet controlled diabetes, osteoarthritis, hypothyroidism, kidney 

stones, restless leg syndrome, and varicose veins. Ultrasound of the lower 

extremities was negative for DVT. BUN was 24 and creatinine was 1.26, above 

patient's baseline. Infectious disease was consulted for cellulitis. Blood c

ultures were ordered. White blood cell count was normal. Repeat in the AM.


Hospitalist coverage 4/15/22-4/18/22 4/19/22


Patient was seen and evaluated at bedside. Patient was tearful, stating she want

s to go home as her leg redness and swelling has not improved. She reports worse

leg pain from when she was admitted due to laying in the hospital bed. Patient 

was advised to walk around more and try wearing ace wrap again to assist with 

swelling. Will discuss course of treatment with infectious disease. 





Objective





- Vital Signs


Vital signs: 


                                   Vital Signs











Temp  97.7 F   04/19/22 07:45


 


Pulse  64   04/19/22 07:45


 


Resp  18   04/19/22 07:45


 


BP  129/76   04/19/22 07:45


 


Pulse Ox  97   04/19/22 07:45








                                 Intake & Output











 04/18/22 04/19/22 04/19/22





 18:59 06:59 18:59


 


Intake Total 720  180


 


Balance 720  180


 


Intake:   


 


  Oral 720  180


 


Other:   


 


  # Voids 1 2 














- Constitutional


General appearance: Present: cooperative, no acute distress, obese





- EENT


Eyes: Present: EOMI, PERRLA


ENT: Present: normal oropharynx





- Neck


Neck: Present: normal ROM





- Respiratory


Respiratory: bilateral: CTA





- Cardiovascular


Heart rate: 60


Rhythm: regular


Heart sounds: normal: S1, S2





- Peripheral edema


  ** leg


Peripheral Edema: right: Trace, left: 1+





- Gastrointestinal


General gastrointestinal: Present: normal bowel sounds, soft





- Integumentary


Integumentary Comment(s): 





erythema of left lower extrem


Integumentary: Present: normal





- Neurologic


Neurologic: Present: CNII-XII intact





- Musculoskeletal


Musculoskeletal: Present: gait normal





- Psychiatric


Psychiatric: Present: A&O x's 3, appropriate affect, intact judgment & insight





- Allied health notes


Allied health notes reviewed: nursing





- Labs


CBC & Chem 7: 


                                 04/18/22 10:09





                                 04/18/22 10:09


Labs: 


                  Abnormal Lab Results - Last 24 Hours (Table)











  04/18/22 Range/Units





  20:30 


 


POC Glucose (mg/dL)  114 H  (75-99)  mg/dL








                      Microbiology - Last 24 Hours (Table)











 04/14/22 13:45 Blood Culture - Preliminary





 Blood    No Growth after 96 hours














Assessment and Plan


Assessment: 





Cellulitis of Left lower extremity


Left leg pain and edema


Acute kidney injury, resolved


Hypertension


Hyperlipidemia


Osteoarthritis


restless leg syndrome


Diet controlled diabetes


Plan: 





Infectious disease consult for cellulitis treatment, IV vancomycin 


Kidney function has improved


Norco home dose ordered for pain management


Continue to monitor vital signs


Will discuss discharge plan with infectious disease.


Further recommendations to come based on patients clinical course.


Time with Patient: Greater than 30

## 2022-04-26 NOTE — P.PN
Subjective


Progress Note Date: 04/19/22


Principal diagnosis: 


Left leg cellulitis





Patient is a 86-year-old  female presenting to the hospital with 

worsening pain swelling redness the left leg failing outpatient oral Bactrim and

cephalexin therapy.


On today's evaluation her that is 04/19/2022, the patient remains to be 

afebrile, the patient discomfort to the left leg has decreased in intensity, the

patient denies chest pain shortness of breath or cough no abdominal pain and no 

diarrhea with antibiotic therapy








Objective





- Vital Signs


Vital signs: 


                                   Vital Signs











Temp  97.7 F   04/19/22 07:45


 


Pulse  64   04/19/22 07:45


 


Resp  18   04/19/22 07:45


 


BP  129/76   04/19/22 07:45


 


Pulse Ox  97   04/19/22 07:45








                                 Intake & Output











 04/18/22 04/19/22 04/19/22





 18:59 06:59 18:59


 


Intake Total 720  180


 


Balance 720  180


 


Intake:   


 


  Oral 720  180


 


Other:   


 


  # Voids 1 2 














- Exam


GENERAL DESCRIPTION: An elderly female lying in bed in no distress





RESPIRATORY SYSTEM: Unlabored breathing , decreased breath sounds at bases





HEART: S1 S2 regular rate and rhythm ,





ABDOMEN: Soft , no tenderness





EXTREMITIES: Left leg with minimal swelling and redness slightly warm to touch








- Labs


CBC & Chem 7: 


                                 04/18/22 10:09





                                 04/18/22 10:09


Labs: 


                  Abnormal Lab Results - Last 24 Hours (Table)











  04/18/22 Range/Units





  20:30 


 


POC Glucose (mg/dL)  114 H  (75-99)  mg/dL








                      Microbiology - Last 24 Hours (Table)











 04/14/22 13:45 Blood Culture - Preliminary





 Blood    No Growth after 96 hours














Assessment and Plan


(1) Failure of outpatient treatment


Status: Acute   Code(s): Z78.9 - OTHER SPECIFIED HEALTH STATUS   SNOMED Code(s):

970077413


   





(2) Left leg cellulitis


Status: Acute   Code(s): L03.116 - CELLULITIS OF LEFT LOWER LIMB   SNOMED 

Code(s): 310320364


   


Plan: 


1patient presented the hospital with left leg pain and swelling and is failing 

outpatient oral Bactrim and cephalexin concerning for cellulitis failing 

outpatient oral antibiotic therapy  more likely from gram-positive skin dank, 

clinically not behaving as an abscess


2-Marked area of the redness


3-Ace wrap to the leg to keep the swelling down


4-patient did have some improvement with IV vancomycin and will finish therapy 

with doxycycline 100 mg twice a day for 7 days along with ACE wrapped to the leg

to keep the swelling down, and discussed with NP of admitting team


Time with Patient: Less than 30

## 2022-05-05 ENCOUNTER — HOSPITAL ENCOUNTER (OUTPATIENT)
Dept: HOSPITAL 47 - LABWHC1 | Age: 87
Discharge: HOME | End: 2022-05-05
Attending: ORTHOPAEDIC SURGERY
Payer: MEDICARE

## 2022-05-05 DIAGNOSIS — Z96.652: ICD-10-CM

## 2022-05-05 DIAGNOSIS — M25.562: ICD-10-CM

## 2022-05-05 DIAGNOSIS — T84.84XA: Primary | ICD-10-CM

## 2022-05-05 LAB
CELL CNT PNL FLD: 100
DEPRECATED POLYS # FLD: 38 %
MONONUC CELLS # FLD: 62 %
NUC CELL # FLD: 70 /UL

## 2022-05-05 PROCEDURE — 89060 EXAM SYNOVIAL FLUID CRYSTALS: CPT

## 2022-05-05 PROCEDURE — 87205 SMEAR GRAM STAIN: CPT

## 2022-05-05 PROCEDURE — 87075 CULTR BACTERIA EXCEPT BLOOD: CPT

## 2022-05-05 PROCEDURE — 86140 C-REACTIVE PROTEIN: CPT

## 2022-05-05 PROCEDURE — 87070 CULTURE OTHR SPECIMN AEROBIC: CPT

## 2022-05-05 PROCEDURE — 89050 BODY FLUID CELL COUNT: CPT

## 2022-05-05 PROCEDURE — 85379 FIBRIN DEGRADATION QUANT: CPT

## 2022-05-05 PROCEDURE — 85652 RBC SED RATE AUTOMATED: CPT

## 2022-05-05 PROCEDURE — 36415 COLL VENOUS BLD VENIPUNCTURE: CPT

## 2022-05-21 NOTE — XR
EXAMINATION TYPE: XR knee complete LT

 

DATE OF EXAM: 10/17/2018

 

CLINICAL HISTORY: Intermittent medial side pain.

 

TECHNIQUE:  Three views of the left knee are obtained.

 

COMPARISON: Left knee x-ray September 9, 2011

 

FINDINGS:  Demineralization is present. There is no acute fracture/dislocation evident in left knee. 
Metallic hardware from left knee arthroplasty is redemonstrated. Some interval spurring and mild eros
jim change along posterior patellar pole is noted from prior. No new suspicious focal lucency is seen
 surrounding prosthesis. A fabella is redemonstrated. Some adjacent small posterior ossific fragments
 are now noted. The overlying soft tissue appears unremarkable.

 

IMPRESSION:  There is no acute fracture or dislocation in the left knee. Expected interval degenerati
ve changes as detailed above
Yes

## 2022-09-29 ENCOUNTER — HOSPITAL ENCOUNTER (OUTPATIENT)
Dept: HOSPITAL 47 - EC | Age: 87
Setting detail: OBSERVATION
LOS: 3 days | Discharge: HOME | End: 2022-10-02
Attending: FAMILY MEDICINE | Admitting: FAMILY MEDICINE
Payer: MEDICARE

## 2022-09-29 DIAGNOSIS — Z91.81: ICD-10-CM

## 2022-09-29 DIAGNOSIS — Z79.890: ICD-10-CM

## 2022-09-29 DIAGNOSIS — N17.9: ICD-10-CM

## 2022-09-29 DIAGNOSIS — G89.29: ICD-10-CM

## 2022-09-29 DIAGNOSIS — N39.0: ICD-10-CM

## 2022-09-29 DIAGNOSIS — M54.9: ICD-10-CM

## 2022-09-29 DIAGNOSIS — F41.9: ICD-10-CM

## 2022-09-29 DIAGNOSIS — M10.9: ICD-10-CM

## 2022-09-29 DIAGNOSIS — G25.81: ICD-10-CM

## 2022-09-29 DIAGNOSIS — F32.A: ICD-10-CM

## 2022-09-29 DIAGNOSIS — E03.9: ICD-10-CM

## 2022-09-29 DIAGNOSIS — Z96.653: ICD-10-CM

## 2022-09-29 DIAGNOSIS — Z88.1: ICD-10-CM

## 2022-09-29 DIAGNOSIS — R55: Primary | ICD-10-CM

## 2022-09-29 DIAGNOSIS — Z87.11: ICD-10-CM

## 2022-09-29 DIAGNOSIS — E78.5: ICD-10-CM

## 2022-09-29 DIAGNOSIS — E86.0: ICD-10-CM

## 2022-09-29 DIAGNOSIS — L03.116: ICD-10-CM

## 2022-09-29 DIAGNOSIS — F19.10: ICD-10-CM

## 2022-09-29 DIAGNOSIS — E11.40: ICD-10-CM

## 2022-09-29 DIAGNOSIS — R26.9: ICD-10-CM

## 2022-09-29 DIAGNOSIS — I10: ICD-10-CM

## 2022-09-29 DIAGNOSIS — Z79.899: ICD-10-CM

## 2022-09-29 DIAGNOSIS — Z87.442: ICD-10-CM

## 2022-09-29 DIAGNOSIS — Z98.1: ICD-10-CM

## 2022-09-29 DIAGNOSIS — M19.90: ICD-10-CM

## 2022-09-29 DIAGNOSIS — Z79.1: ICD-10-CM

## 2022-09-29 DIAGNOSIS — Z88.5: ICD-10-CM

## 2022-09-29 LAB
ALBUMIN SERPL-MCNC: 3.9 G/DL (ref 3.5–5)
ALP SERPL-CCNC: 163 U/L (ref 38–126)
ALT SERPL-CCNC: 16 U/L (ref 4–34)
ANION GAP SERPL CALC-SCNC: 10 MMOL/L
APAP SPEC-MCNC: <10 UG/ML
APTT BLD: 21.8 SEC (ref 22–30)
AST SERPL-CCNC: 34 U/L (ref 14–36)
BASOPHILS # BLD AUTO: 0 K/UL (ref 0–0.2)
BASOPHILS NFR BLD AUTO: 1 %
BUN SERPL-SCNC: 28 MG/DL (ref 7–17)
CALCIUM SPEC-MCNC: 10.3 MG/DL (ref 8.4–10.2)
CHLORIDE SERPL-SCNC: 103 MMOL/L (ref 98–107)
CO2 SERPL-SCNC: 26 MMOL/L (ref 22–30)
EOSINOPHIL # BLD AUTO: 0.2 K/UL (ref 0–0.7)
EOSINOPHIL NFR BLD AUTO: 3 %
ERYTHROCYTE [DISTWIDTH] IN BLOOD BY AUTOMATED COUNT: 4.82 M/UL (ref 3.8–5.4)
ERYTHROCYTE [DISTWIDTH] IN BLOOD: 14.3 % (ref 11.5–15.5)
GLUCOSE SERPL-MCNC: 96 MG/DL (ref 74–99)
HCT VFR BLD AUTO: 43.7 % (ref 34–46)
HGB BLD-MCNC: 14.1 GM/DL (ref 11.4–16)
INR PPP: 1 (ref ?–1.2)
LYMPHOCYTES # SPEC AUTO: 0.8 K/UL (ref 1–4.8)
LYMPHOCYTES NFR SPEC AUTO: 13 %
MCH RBC QN AUTO: 29.2 PG (ref 25–35)
MCHC RBC AUTO-ENTMCNC: 32.2 G/DL (ref 31–37)
MCV RBC AUTO: 90.7 FL (ref 80–100)
MONOCYTES # BLD AUTO: 0.5 K/UL (ref 0–1)
MONOCYTES NFR BLD AUTO: 7 %
NEUTROPHILS # BLD AUTO: 4.7 K/UL (ref 1.3–7.7)
NEUTROPHILS NFR BLD AUTO: 74 %
PH UR: 8 [PH] (ref 5–8)
PLATELET # BLD AUTO: 111 K/UL (ref 150–450)
POTASSIUM SERPL-SCNC: 4.8 MMOL/L (ref 3.5–5.1)
PROT SERPL-MCNC: 6.6 G/DL (ref 6.3–8.2)
PROT UR QL: (no result)
PT BLD: 11.2 SEC (ref 9–12)
RBC UR QL: 29 /HPF (ref 0–5)
SALICYLATES SERPL-MCNC: <1 MG/DL
SODIUM SERPL-SCNC: 139 MMOL/L (ref 137–145)
SP GR UR: 1.01 (ref 1–1.03)
UROBILINOGEN UR QL STRIP: <2 MG/DL (ref ?–2)
WBC # BLD AUTO: 6.3 K/UL (ref 3.8–10.6)
WBC # UR AUTO: >182 /HPF (ref 0–5)

## 2022-09-29 PROCEDURE — 80143 DRUG ASSAY ACETAMINOPHEN: CPT

## 2022-09-29 PROCEDURE — 80306 DRUG TEST PRSMV INSTRMNT: CPT

## 2022-09-29 PROCEDURE — 96361 HYDRATE IV INFUSION ADD-ON: CPT

## 2022-09-29 PROCEDURE — 80076 HEPATIC FUNCTION PANEL: CPT

## 2022-09-29 PROCEDURE — 85730 THROMBOPLASTIN TIME PARTIAL: CPT

## 2022-09-29 PROCEDURE — 80320 DRUG SCREEN QUANTALCOHOLS: CPT

## 2022-09-29 PROCEDURE — 96366 THER/PROPH/DIAG IV INF ADDON: CPT

## 2022-09-29 PROCEDURE — 80179 DRUG ASSAY SALICYLATE: CPT

## 2022-09-29 PROCEDURE — 93005 ELECTROCARDIOGRAM TRACING: CPT

## 2022-09-29 PROCEDURE — 96372 THER/PROPH/DIAG INJ SC/IM: CPT

## 2022-09-29 PROCEDURE — 87086 URINE CULTURE/COLONY COUNT: CPT

## 2022-09-29 PROCEDURE — 87077 CULTURE AEROBIC IDENTIFY: CPT

## 2022-09-29 PROCEDURE — 85610 PROTHROMBIN TIME: CPT

## 2022-09-29 PROCEDURE — 99284 EMERGENCY DEPT VISIT MOD MDM: CPT

## 2022-09-29 PROCEDURE — 96365 THER/PROPH/DIAG IV INF INIT: CPT

## 2022-09-29 PROCEDURE — 80048 BASIC METABOLIC PNL TOTAL CA: CPT

## 2022-09-29 PROCEDURE — 36415 COLL VENOUS BLD VENIPUNCTURE: CPT

## 2022-09-29 PROCEDURE — 82310 ASSAY OF CALCIUM: CPT

## 2022-09-29 PROCEDURE — 85025 COMPLETE CBC W/AUTO DIFF WBC: CPT

## 2022-09-29 PROCEDURE — 96376 TX/PRO/DX INJ SAME DRUG ADON: CPT

## 2022-09-29 PROCEDURE — 96375 TX/PRO/DX INJ NEW DRUG ADDON: CPT

## 2022-09-29 PROCEDURE — 70450 CT HEAD/BRAIN W/O DYE: CPT

## 2022-09-29 PROCEDURE — 72125 CT NECK SPINE W/O DYE: CPT

## 2022-09-29 PROCEDURE — 81001 URINALYSIS AUTO W/SCOPE: CPT

## 2022-09-29 PROCEDURE — 87186 SC STD MICRODIL/AGAR DIL: CPT

## 2022-09-29 PROCEDURE — 84484 ASSAY OF TROPONIN QUANT: CPT

## 2022-09-29 PROCEDURE — 80053 COMPREHEN METABOLIC PANEL: CPT

## 2022-09-29 RX ADMIN — KETOROLAC TROMETHAMINE PRN MG: 15 INJECTION, SOLUTION INTRAMUSCULAR; INTRAVENOUS at 17:35

## 2022-09-29 NOTE — CT
EXAMINATION TYPE: CT brain cspine wo con

 

DATE OF EXAM: 9/29/2022

 

COMPARISON: None

 

HISTORY: syncope and fall

 

CT DLP: 1525.9 mGycm

 

Unenhanced CT of the brain was performed.  

 

The ventricles, basal cisterns and sulci overlying the cerebral convexities demonstrate mild enlargem
ent.  

 

There is no evidence for intracranial hemorrhage or sulcal effacement.  There is decreased attenuatio
n about the periventricular white matter and deep white matter of both cerebral hemispheres, compatib
le with chronic small vessel ischemia.  

 

No mass effects are seen.  

If symptoms persist consider MRI.  

 

Osseous calvarium is intact.

 

IMPRESSION:

 

1.  Age related atrophic and chronic small vessel ischemic change without acute intracranial process 
seen at this time.

 

CT Cervical Spine:

 

Unenhanced CT of the cervical spine was performed with bone and soft tissue window settings submitted
.  Coronal and sagittal reconstruction is obtained.  

 

There is normal alignment and prevertebral soft tissues. No evidence for acute cervical fracture .   
Scattered degenerative disc disease and spondylosis. Biapical scarring.   

 

IMPRESSION:

 

1.  No evidence for acute fracture or subluxation of the cervical spine.

## 2022-09-29 NOTE — ED
Syncope HPI





- General


Chief Complaint: Syncope


Stated Complaint: syncope


Time Seen by Provider: 22 10:16


Source: patient, EMS, RN notes reviewed


Mode of arrival: EMS


Limitations: no limitations





- History of Present Illness


Initial Comments: 


This is an 87-year-old female who presents to the emergency department for a 

syncopal episode.  Patient was brushing her hair in the bathroom this morning, 

when she felt dizzy, and knew that she was going to pass out.  States that she 

just "let it happen".  She fell backwards and hit her head.  Not currently 

taking any blood thinners.  She has been self-medicating for back pain.  States 

that her surgeon has not been able to do anything else for her back pain.  It is

unclear what she has taken, however EMS believes that it may have been Norco and

Ativan.  They did also find a bottle of Valium.  Patient is very drowsy but 

alert in the examination room. States that the medication was given to her by 

Dr. Awan, who the patient states that she is no longer seeing for primary care 

management. Her  is at bedside and states that the patient was not acting

like herself before the fall and still seems very drowsy.





Denies any fevers, chills, sore throat, cough, dyspnea, chest pain, palpi

tations, abdominal pain, nausea, vomiting, or diarrhea.





MD Complaint: loss of consciousness


Prodromal Symptoms: lightheaded


Witnessed: no


Injuries Sustained Associated with Event: Head





- Related Data


                                Home Medications











 Medication  Instructions  Recorded  Confirmed


 


Omega-3 Fatty Acids/Fish Oil [Fish 1,000 mg PO BID 18





Oil 1,000 mg Softgel]   


 


Vitamin B Complex 1 cap PO BID 18


 


Vitamin E (Dl,Tocopheryl Acet) 400 unit PO BID 18





[Vitamin E (400 Iu = 180 mg)]   


 


Furosemide [Lasix] 40 mg PO DAILY 21


 


Glucosamine HCl/Chondroitin Dumont 1 cap PO BID 21





[Glucosamine-Chondroitin Cap]   


 


Levothyroxine Sodium [Synthroid] 100 mcg PO W/BRKFST 21


 


Potassium Chloride ER [K-Dur 20] 40 meq PO DAILY 21


 


Spironolactone [Aldactone] 12.5 mg PO DAILY 21


 


Ubidecarenone [Co Q-10] 200 mg PO DAILY 21


 


rOPINIRole HCL [Requip] 6 mg PO HS 21


 


Adrenal Complex 1 cap PO BID 22


 


Ascorbic Acid [Vitamin C] 500 mg PO BID 22


 


Cholecalciferol (Vitamin D3) 125 mcg PO DAILY 22





[Vitamin D3 (125 MCG = 5,000 IU)]   


 


Garlic 1,000 mg PO BID 22


 


HYDROcodone/APAP 5-325MG [Norco 1 tab PO Q6H PRN 22





5-325]   


 


LORazepam [Ativan] 1 mg PO HS 22


 


Magnesium Oxide [Mead] 500 mg PO QID 22


 


Meloxicam [Mobic] 7.5 mg PO DAILY 22


 


Multivitamins, Thera [Multivitamin 1 tab PO DAILY 22





(formulary)]   


 


Pantoprazole [Protonix] 40 mg PO DAILY 22


 


Zinc 50 mg PO DAILY 22








                                  Previous Rx's











 Medication  Instructions  Recorded


 


Doxycycline Hyclate 100 mg PO BID 7 Days #14 tab 22











                                    Allergies











Allergy/AdvReac Type Severity Reaction Status Date / Time


 


hydrochlorothiazide AdvReac  Nausea & Verified 22 14:14





   Vomiting  


 


levofloxacin [From Levaquin] AdvReac  n/v, Verified 22 14:14





   severe  





   muscle pain  


 


oxycodone HCl [From Percocet] AdvReac  Vomiting Verified 22 14:14


 


warfarin sodium AdvReac  GI BLEED Verified 22 14:14





[From Coumadin]     














Review of Systems


ROS Statement: 


Those systems with pertinent positive or pertinent negative responses have been 

documented in the HPI.





ROS Other: All systems not noted in ROS Statement are negative.





Past Medical History


Past Medical History: Diabetes Mellitus, GI Bleed, Osteoarthritis (OA), Thyroid 

Disorder


Additional Past Medical History / Comment(s): kidney stones, PSEUDO GOUT ,PEPTIC

ULCERS, varicose veins, restless leg syndrome, diet control diabetic,


History of Any Multi-Drug Resistant Organisms: None Reported


Past Surgical History: Back Surgery,  Section, Hysterectomy, Orthopedic 

Surgery, Tonsillectomy


Additional Past Surgical History / Comment(s): D&C, lithotripsy x2, tonsi

llectomy x 2, C/S x3, surgery to removed large stones from elizabeth kidneys, elizabeth 

cataracts, elizabeth knee replacements, parathyroid surgery, spinal fusion, vein 

strippiing left leg


Past Anesthesia/Blood Transfusion Reactions: Motion Sickness


Past Psychological History: No Psychological Hx Reported


Smoking Status: Never smoker


Past Alcohol Use History: None Reported


Past Drug Use History: None Reported





- Past Family History


  ** Brother(s)


Family Medical History: Cancer


Additional Family Medical History / Comment(s): colon





  ** Father


Family Medical History: Cancer


Additional Family Medical History / Comment(s): colon





General Exam


Limitations: no limitations


General appearance: alert, other (Drowsy)


Head exam: Present: atraumatic, normocephalic, normal inspection


Eye exam: Present: other (Constricted but reactive pupils bilaterally)


Respiratory exam: Present: normal lung sounds bilaterally.  Absent: respiratory 

distress, wheezes, rales, rhonchi, stridor


Cardiovascular Exam: Present: regular rate, normal rhythm, normal heart sounds. 

Absent: systolic murmur, diastolic murmur, rubs, gallop, clicks


GI/Abdominal exam: Present: soft, normal bowel sounds.  Absent: distended, 

tenderness, guarding, rebound, rigid


Neurological exam: Present: alert


Skin exam: Present: warm, dry, intact, normal color.  Absent: rash





Course


                                   Vital Signs











  22





  10:12 11:57 13:18


 


Temperature 97.6 F 97.1 F L 


 


Pulse Rate 71 81 


 


Respiratory 14 16 19





Rate   


 


Blood Pressure 139/84 149/93 


 


O2 Sat by Pulse 96 97 





Oximetry   














  22





  14:21


 


Temperature 


 


Pulse Rate 87


 


Respiratory 12





Rate 


 


Blood Pressure 149/93


 


O2 Sat by Pulse 97





Oximetry 














EKG Findings





- EKG Comments:


EKG Findings:: Sinus rhythm.  Right bundle branch block.  Normal axis.  

Ventricular rate 71 bpm, IL interval 200 ms, QRS duration 144 ms,  ms.





Medical Decision Making





- Medical Decision Making


This is an 87-year-old female who presents to the emergency department for a 

syncopal episode.  Lab work was nonactionable, the patient has no elevated 

levels of acetaminophen, salicylates, or alcohol.  Computed tomography scan of 

the brain and C-spine obtained, revealing no acute irregularities, such as signs

of intracranial hemorrhage or fracture/dislocations.  Urinalysis consistent with

infection.  Urine drug screen positive for benzodiazepines and opioids.  Patient

was given Narcan with no change in her level of drowsiness or overall mentation.

 Given that the patient is somewhat altered and very drowsy, will admit the 

patient for observation in relation to the syncopal episode.  This is most 

likely related to polysubstance abuse, however we cannot rule out a cardiac 

etiology.





This case was discussed in detail with the attending ED physician. Presentation,

findings, and treatment plan discussed in detail as well. 








- Lab Data


Result diagrams: 


                                 22 10:46





                                 22 10:46


                                   Lab Results











  22 Range/Units





  10:46 10:46 10:46 


 


WBC  6.3    (3.8-10.6)  k/uL


 


RBC  4.82    (3.80-5.40)  m/uL


 


Hgb  14.1    (11.4-16.0)  gm/dL


 


Hct  43.7    (34.0-46.0)  %


 


MCV  90.7    (80.0-100.0)  fL


 


MCH  29.2    (25.0-35.0)  pg


 


MCHC  32.2    (31.0-37.0)  g/dL


 


RDW  14.3    (11.5-15.5)  %


 


Plt Count  111 L    (150-450)  k/uL


 


MPV  7.4    


 


Neutrophils %  74    %


 


Lymphocytes %  13    %


 


Monocytes %  7    %


 


Eosinophils %  3    %


 


Basophils %  1    %


 


Neutrophils #  4.7    (1.3-7.7)  k/uL


 


Lymphocytes #  0.8 L    (1.0-4.8)  k/uL


 


Monocytes #  0.5    (0-1.0)  k/uL


 


Eosinophils #  0.2    (0-0.7)  k/uL


 


Basophils #  0.0    (0-0.2)  k/uL


 


PT   11.2   (9.0-12.0)  sec


 


INR   1.0   (<1.2)  


 


APTT   21.8 L   (22.0-30.0)  sec


 


Sodium    139  (137-145)  mmol/L


 


Potassium    4.8  (3.5-5.1)  mmol/L


 


Chloride    103  ()  mmol/L


 


Carbon Dioxide    26  (22-30)  mmol/L


 


Anion Gap    10  mmol/L


 


BUN    28 H  (7-17)  mg/dL


 


Creatinine    0.94  (0.52-1.04)  mg/dL


 


Est GFR (CKD-EPI)AfAm    64  (>60 ml/min/1.73 sqM)  


 


Est GFR (CKD-EPI)NonAf    56  (>60 ml/min/1.73 sqM)  


 


Glucose    96  (74-99)  mg/dL


 


Calcium    10.3 H  (8.4-10.2)  mg/dL


 


Total Bilirubin    1.7 H  (0.2-1.3)  mg/dL


 


AST    34  (14-36)  U/L


 


ALT    16  (4-34)  U/L


 


Alkaline Phosphatase    163 H  ()  U/L


 


Troponin I     (0.000-0.034)  ng/mL


 


Total Protein    6.6  (6.3-8.2)  g/dL


 


Albumin    3.9  (3.5-5.0)  g/dL


 


Urine Color     


 


Urine Appearance     (Clear)  


 


Urine pH     (5.0-8.0)  


 


Ur Specific Gravity     (1.001-1.035)  


 


Urine Protein     (Negative)  


 


Urine Glucose (UA)     (Negative)  


 


Urine Ketones     (Negative)  


 


Urine Blood     (Negative)  


 


Urine Nitrite     (Negative)  


 


Urine Bilirubin     (Negative)  


 


Urine Urobilinogen     (<2.0)  mg/dL


 


Ur Leukocyte Esterase     (Negative)  


 


Urine RBC     (0-5)  /hpf


 


Urine WBC     (0-5)  /hpf


 


Amorphous Sediment     (None)  /hpf


 


Urine Bacteria     (None)  /hpf


 


Urine Mucus     (None)  /hpf


 


Salicylates    <1.0  mg/dL


 


Urine Opiates Screen     (NotDetected)  


 


Ur Oxycodone Screen     (NotDetected)  


 


Urine Methadone Screen     (NotDetected)  


 


Ur Propoxyphene Screen     (NotDetected)  


 


Acetaminophen    <10.0  ug/mL


 


Ur Barbiturates Screen     (NotDetected)  


 


U Tricyclic Antidepress     (NotDetected)  


 


Ur Phencyclidine Scrn     (NotDetected)  


 


Ur Amphetamines Screen     (NotDetected)  


 


U Methamphetamines Scrn     (NotDetected)  


 


U Benzodiazepines Scrn     (NotDetected)  


 


Urine Cocaine Screen     (NotDetected)  


 


U Marijuana (THC) Screen     (NotDetected)  


 


Serum Alcohol    <10  mg/dL














  22 Range/Units





  10:46 11:57 


 


WBC    (3.8-10.6)  k/uL


 


RBC    (3.80-5.40)  m/uL


 


Hgb    (11.4-16.0)  gm/dL


 


Hct    (34.0-46.0)  %


 


MCV    (80.0-100.0)  fL


 


MCH    (25.0-35.0)  pg


 


MCHC    (31.0-37.0)  g/dL


 


RDW    (11.5-15.5)  %


 


Plt Count    (150-450)  k/uL


 


MPV    


 


Neutrophils %    %


 


Lymphocytes %    %


 


Monocytes %    %


 


Eosinophils %    %


 


Basophils %    %


 


Neutrophils #    (1.3-7.7)  k/uL


 


Lymphocytes #    (1.0-4.8)  k/uL


 


Monocytes #    (0-1.0)  k/uL


 


Eosinophils #    (0-0.7)  k/uL


 


Basophils #    (0-0.2)  k/uL


 


PT    (9.0-12.0)  sec


 


INR    (<1.2)  


 


APTT    (22.0-30.0)  sec


 


Sodium    (137-145)  mmol/L


 


Potassium    (3.5-5.1)  mmol/L


 


Chloride    ()  mmol/L


 


Carbon Dioxide    (22-30)  mmol/L


 


Anion Gap    mmol/L


 


BUN    (7-17)  mg/dL


 


Creatinine    (0.52-1.04)  mg/dL


 


Est GFR (CKD-EPI)AfAm    (>60 ml/min/1.73 sqM)  


 


Est GFR (CKD-EPI)NonAf    (>60 ml/min/1.73 sqM)  


 


Glucose    (74-99)  mg/dL


 


Calcium    (8.4-10.2)  mg/dL


 


Total Bilirubin    (0.2-1.3)  mg/dL


 


AST    (14-36)  U/L


 


ALT    (4-34)  U/L


 


Alkaline Phosphatase    ()  U/L


 


Troponin I  <0.012   (0.000-0.034)  ng/mL


 


Total Protein    (6.3-8.2)  g/dL


 


Albumin    (3.5-5.0)  g/dL


 


Urine Color   Yellow  


 


Urine Appearance   Turbid H  (Clear)  


 


Urine pH   8.0  (5.0-8.0)  


 


Ur Specific Gravity   1.014  (1.001-1.035)  


 


Urine Protein   1+ H  (Negative)  


 


Urine Glucose (UA)   Negative  (Negative)  


 


Urine Ketones   Negative  (Negative)  


 


Urine Blood   Large H  (Negative)  


 


Urine Nitrite   Negative  (Negative)  


 


Urine Bilirubin   Negative  (Negative)  


 


Urine Urobilinogen   <2.0  (<2.0)  mg/dL


 


Ur Leukocyte Esterase   Large H  (Negative)  


 


Urine RBC   29 H  (0-5)  /hpf


 


Urine WBC   >182 H  (0-5)  /hpf


 


Amorphous Sediment   Moderate H  (None)  /hpf


 


Urine Bacteria   Few H  (None)  /hpf


 


Urine Mucus   Rare H  (None)  /hpf


 


Salicylates    mg/dL


 


Urine Opiates Screen   Detected H  (NotDetected)  


 


Ur Oxycodone Screen   Not Detected  (NotDetected)  


 


Urine Methadone Screen   Not Detected  (NotDetected)  


 


Ur Propoxyphene Screen   Not Detected  (NotDetected)  


 


Acetaminophen    ug/mL


 


Ur Barbiturates Screen   Not Detected  (NotDetected)  


 


U Tricyclic Antidepress   Not Detected  (NotDetected)  


 


Ur Phencyclidine Scrn   Not Detected  (NotDetected)  


 


Ur Amphetamines Screen   Not Detected  (NotDetected)  


 


U Methamphetamines Scrn   Not Detected  (NotDetected)  


 


U Benzodiazepines Scrn   Detected H  (NotDetected)  


 


Urine Cocaine Screen   Not Detected  (NotDetected)  


 


U Marijuana (THC) Screen   Not Detected  (NotDetected)  


 


Serum Alcohol    mg/dL














- Radiology Data


Radiology results: report reviewed, image reviewed





Disposition


Clinical Impression: 


 Syncope and collapse, Polysubstance abuse, UTI (urinary tract infection)





Disposition: ADMITTED AS IP TO THIS HOSP

## 2022-09-30 LAB
GLUCOSE BLD-MCNC: 108 MG/DL (ref 70–110)
GLUCOSE BLD-MCNC: 116 MG/DL (ref 70–110)
GLUCOSE BLD-MCNC: 122 MG/DL (ref 70–110)
GLUCOSE BLD-MCNC: 133 MG/DL (ref 70–110)

## 2022-09-30 RX ADMIN — HEPARIN SODIUM SCH UNIT: 5000 INJECTION INTRAVENOUS; SUBCUTANEOUS at 19:52

## 2022-09-30 RX ADMIN — KETOROLAC TROMETHAMINE PRN MG: 15 INJECTION, SOLUTION INTRAMUSCULAR; INTRAVENOUS at 02:11

## 2022-09-30 RX ADMIN — KETOROLAC TROMETHAMINE PRN MG: 15 INJECTION, SOLUTION INTRAMUSCULAR; INTRAVENOUS at 17:05

## 2022-09-30 RX ADMIN — ROPINIROLE SCH MG: 4 TABLET, FILM COATED ORAL at 19:52

## 2022-09-30 RX ADMIN — KETOROLAC TROMETHAMINE PRN MG: 15 INJECTION, SOLUTION INTRAMUSCULAR; INTRAVENOUS at 11:06

## 2022-09-30 RX ADMIN — PANTOPRAZOLE SODIUM SCH MG: 40 INJECTION, POWDER, FOR SOLUTION INTRAVENOUS at 14:41

## 2022-09-30 RX ADMIN — LEVOTHYROXINE SODIUM SCH: 100 TABLET ORAL at 09:14

## 2022-09-30 RX ADMIN — HEPARIN SODIUM SCH UNIT: 5000 INJECTION INTRAVENOUS; SUBCUTANEOUS at 14:41

## 2022-09-30 NOTE — P.HPIM
History of Present Illness


H&P Date: 22


Chief Complaint: Syncope


This is an 87-year-old female recently changed PCPs, with past medical history 

of chronic back pain, spinal fusion, diabetes mellitus, diabetic neuropathy 

,osteoarthritis, hypothyroidism and multiple other medical issues brought into 

the ER via EMS.  Patient reported she had wet the bed, gotten up to change, 

ambulated into the bathroom, felt a little dizzy and tripped over diapers, while

brushing her hair.  Reports urinary frequency, urgency.  Troponin negative.  

Recent echo 2 weeks ago at Dr. Hoffman's  office reported preserved LV function.

 Denies chest pain, palpitations or shortness of breath.  Reports she had taken 

both Norco and Ativan together-the first time in " awhile, taking this combo" 

for her back pain, fell back and hit her head. EMS discovered a bottle of 

Valium. Brain/C-spine reported negative.  Per ER record report significant other

reported patient appeared drowsy prior to the fall.  UA reporting large 

leukocytes, greater than 182 WBCs, nitrate negative, 29 and the sites esterase, 

few bacteria, culture pending.  Toxicology screen detected opiates, 

benzodiazepines, serum alcohol less than 10.  Renal function stable.  Afebrile, 

T-max 99, normal WBC.  Blood sugars controlled.  T bili 1.7, alk phos 163.





Review of Systems





ROS Statement: 


Those systems with pertinent positive or pertinent negative responses have been 

documented in the HPI.





ROS Other: All systems not noted in ROS Statement are negative.








Past Medical History


Past Medical History: Diabetes Mellitus, GI Bleed, Osteoarthritis (OA), Thyroid 

Disorder


Additional Past Medical History / Comment(s): kidney stones, PSEUDO GOUT ,PEPTIC

ULCERS, varicose veins, restless leg syndrome, diet control diabetic,


History of Any Multi-Drug Resistant Organisms: None Reported


Past Surgical History: Back Surgery,  Section, Hysterectomy, Orthopedic 

Surgery, Tonsillectomy


Additional Past Surgical History / Comment(s): D&C, lithotripsy x2, 

tonsillectomy x 2, C/S x3, surgery to removed large stones from elizabeth kidneys, elizabeth

cataracts, elizabeth knee replacements, parathyroid surgery, spinal fusion, vein 

strippiing left leg


Past Anesthesia/Blood Transfusion Reactions: Motion Sickness


Past Psychological History: No Psychological Hx Reported


Smoking Status: Never smoker


Past Alcohol Use History: None Reported


Past Drug Use History: None Reported





- Past Family History


  ** Brother(s)


Family Medical History: Cancer


Additional Family Medical History / Comment(s): colon





  ** Father


Family Medical History: Cancer


Additional Family Medical History / Comment(s): colon





Medications and Allergies


                                Home Medications











 Medication  Instructions  Recorded  Confirmed  Type


 


Omega-3 Fatty Acids/Fish Oil [Fish 1,000 mg PO BID 18 History





Oil 1,000 mg Softgel]    


 


Vitamin B Complex 1 cap PO BID 18 History


 


Vitamin E (Dl,Tocopheryl Acet) 400 unit PO BID 18 History





[Vitamin E (400 Iu = 180 mg)]    


 


Furosemide [Lasix] 40 mg PO DAILY 21 History


 


Glucosamine HCl/Chondroitin Dumont 1 cap PO DAILY 21 History





[Glucosamine-Chondroitin Cap]    


 


Levothyroxine Sodium [Synthroid] 100 mcg PO W/BRKFST 21 History


 


Potassium Chloride ER [K-Dur 20] 40 meq PO DAILY 21 History


 


Spironolactone [Aldactone] 12.5 mg PO DAILY 21 History


 


Ubidecarenone [Co Q-10] 200 mg PO DAILY 21 History


 


rOPINIRole HCL [Requip] 6 mg PO HS 21 History


 


Adrenal Complex 1 cap PO BID 22 History


 


Ascorbic Acid [Vitamin C] 500 mg PO DAILY 22 History


 


Cholecalciferol (Vitamin D3) 125 mcg PO DAILY 22 History





[Vitamin D3 (125 MCG = 5,000 IU)]    


 


Garlic 1,000 mg PO BID 22 History


 


HYDROcodone/APAP 5-325MG [Norco 1 tab PO Q8H PRN 22 History





5-325]    


 


LORazepam [Ativan] 1 mg PO HS 22 History


 


Magnesium Oxide [Mead] 500 mg PO QID 22 History


 


Meloxicam [Mobic] 7.5 mg PO DAILY 22 History


 


Multivitamins, Thera [Multivitamin 1 tab PO DAILY 22 History





(formulary)]    


 


Pantoprazole [Protonix] 40 mg PO DAILY 22 History


 


Zinc 50 mg PO DAILY 22 History


 


Colchicine 0.6 mg PO DAILY 22 History


 


DULoxetine HCL [Cymbalta] 30 mg PO DAILY 22 History


 


Gabapentin [Neurontin] 100 mg PO DAILY 22 History


 


Haim's Leg Cramps 1 tab PO DAILY PRN 22 History


 


Ibuprofen [Motrin Ib] 400 mg PO Q6H PRN 22 History


 


Mirabegron [Myrbetriq] 50 mg PO DAILY 22 History


 


Naproxen Sodium [Aleve] 220 mg PO BID PRN 22 History


 


S-Adenosylmethionine Sul Tosyl 200 mg PO DAILY 22 History





[Rosalio-E]    








                                    Allergies











Allergy/AdvReac Type Severity Reaction Status Date / Time


 


hydrochlorothiazide AdvReac  Nausea & Verified 22 14:14





   Vomiting  


 


levofloxacin [From Levaquin] AdvReac  n/v, Verified 22 14:14





   severe  





   muscle pain  


 


oxycodone HCl [From Percocet] AdvReac  Vomiting Verified 22 14:14


 


warfarin sodium AdvReac  GI BLEED Verified 22 14:14





[From Coumadin]     














Physical Exam


Vitals: 


                                   Vital Signs











  Temp Pulse Pulse Resp BP BP Pulse Ox


 


 22 09:06  97.6 F   75  17   105/67  97


 


 22 02:12   74   18  103/54   96


 


 22 20:51  99.0 F  87   18  103/88   97


 


 22 20:00   82   16    96


 


 22 17:38   80   16  120/69   96


 


 22 14:21   87   12  149/93   97


 


 22 13:18     19   


 


 22 11:57  97.1 F L  81   16  149/93   97








                                Intake and Output











 22





 22:59 06:59 14:59


 


Other:   


 


  Weight  86.183 kg 














Physical exam:





General:Patient is sitting up in bed comfortably, NAD,  


HEENT: Normocephalic. Neck is supple. Pupils reactive. MMM.


Neck: Supple, no JVD, carotid bruits, or thyromegaly. 


CHEST EXAMINATION: Trachea is central. Symmetrical expansion. Normal S1, S2 with

no gallops. No murmurs 


Respiratory: Nonlabored, CTA with diminished bilateral bases, no  rhonchi, no 

wheezing noted. 


ABDOMEN: Soft.  Nontender, Bowel sounds normal.  No guarding, no rigidity ,No 

organomegaly. No abdominal bruits. 


Extremities: left lower extremity with mnimal redness noted-prior cellulitis, 

+DP, No clubbing or cyanosis


Neurologically awake, alert, oriented x3 with well-coordinated movements.  No 

focal deficits noted


Skin: Warm and dry, no rashes noted


Psychiatric: Alert and reassess 3, mood and affect normal








Results


CBC & Chem 7: 


                                 22 10:46





                                 22 10:46


Labs: 


                  Abnormal Lab Results - Last 24 Hours (Table)











  22 Range/Units





  10:46 10:46 11:57 


 


APTT  21.8 L    (22.0-30.0)  sec


 


BUN   28 H   (7-17)  mg/dL


 


POC Glucose (mg/dL)     ()  mg/dL


 


Calcium   10.3 H   (8.4-10.2)  mg/dL


 


Total Bilirubin   1.7 H   (0.2-1.3)  mg/dL


 


Alkaline Phosphatase   163 H   ()  U/L


 


Urine Appearance    Turbid H  (Clear)  


 


Urine Protein    1+ H  (Negative)  


 


Urine Blood    Large H  (Negative)  


 


Ur Leukocyte Esterase    Large H  (Negative)  


 


Urine RBC    29 H  (0-5)  /hpf


 


Urine WBC    >182 H  (0-5)  /hpf


 


Amorphous Sediment    Moderate H  (None)  /hpf


 


Urine Bacteria    Few H  (None)  /hpf


 


Urine Mucus    Rare H  (None)  /hpf


 


Urine Opiates Screen    Detected H  (NotDetected)  


 


U Benzodiazepines Scrn    Detected H  (NotDetected)  














  22 Range/Units





  09:04 


 


APTT   (22.0-30.0)  sec


 


BUN   (7-17)  mg/dL


 


POC Glucose (mg/dL)  122 H  ()  mg/dL


 


Calcium   (8.4-10.2)  mg/dL


 


Total Bilirubin   (0.2-1.3)  mg/dL


 


Alkaline Phosphatase   ()  U/L


 


Urine Appearance   (Clear)  


 


Urine Protein   (Negative)  


 


Urine Blood   (Negative)  


 


Ur Leukocyte Esterase   (Negative)  


 


Urine RBC   (0-5)  /hpf


 


Urine WBC   (0-5)  /hpf


 


Amorphous Sediment   (None)  /hpf


 


Urine Bacteria   (None)  /hpf


 


Urine Mucus   (None)  /hpf


 


Urine Opiates Screen   (NotDetected)  


 


U Benzodiazepines Scrn   (NotDetected)  








                      Microbiology - Last 24 Hours (Table)











 22 11:57 Urine Culture - Preliminary





 Urine,Voided 














Thrombosis Risk Factor Assmnt





- Choose All That Apply


Any of the Below Risk Factors Present?: Yes


Each Factor Represents 1 point: Obesity (BMI >25)


Other Risk Factors: Yes


Each Risk Factor Represents 3 Points: Age 75 years or older


Other congenital or acquired thrombophilia - If yes, enter type in comment: No


Thrombosis Risk Factor Assessment Total Risk Factor Score: 4


Thrombosis Risk Factor Assessment Level: Moderate Risk





Assessment and Plan


Assessment: 





Syncope, collapse, suspect polysubstance abuse as well as acute UTI, culture 

pending.


Gait dysfunction, history of falls


Recent Cellulitis of Left lower extremity, improved.


Left leg pain and edema secondary to above


Acute kidney injury, secondary to dehydration, improved


Hypertension


Hyperlipidemia


Osteoarthritis


restless leg syndrome


Diet controlled diabetes


Chronic back pain, follows with Dr. Woodward,OA, recommend further follow-up 

outpatient


Anxiety, depression

















Plan: Continue on current medication regime ,monitoring and symptomatic 

treatment.  Maintain IV antibiotics, urine culture pending.  Orthostatic vitals 

ordered Discussed dangers of self-medicating/combining benzos with opioids. We 

will discontinue Ativan-discussed initiating Lexapro or BuSpar at [facility].  PT/OT 

consulted.  PPI for GI prophylaxis.  Heparin subcu for DVT prophylaxis.  

Discharge planning in progress for tomorrow.














The impression and plan of care has been dictated as directed.





:


I performed a history and examination of this patient,  discussed the same with 

the dictator.  I agree with the dictator's note ,documented as a scribe.  Any 

additional findings or plans will be noted.

## 2022-10-01 VITALS — TEMPERATURE: 97.9 F

## 2022-10-01 LAB
ALBUMIN SERPL-MCNC: 3.2 G/DL (ref 3.5–5)
ALBUMIN/GLOB SERPL: 1.2 {RATIO}
ALP SERPL-CCNC: 116 U/L (ref 38–126)
ALT SERPL-CCNC: 17 U/L (ref 4–34)
ANION GAP SERPL CALC-SCNC: 12 MMOL/L
AST SERPL-CCNC: 26 U/L (ref 14–36)
BILIRUB INDIRECT SERPL-MCNC: 0.9 MG/DL (ref 0–1.1)
BUN SERPL-SCNC: 33 MG/DL (ref 7–17)
CALCIUM SPEC-MCNC: 9.7 MG/DL (ref 8.4–10.2)
CELLS COUNTED: 100
CHLORIDE SERPL-SCNC: 101 MMOL/L (ref 98–107)
CO2 SERPL-SCNC: 23 MMOL/L (ref 22–30)
EOSINOPHIL # BLD MANUAL: 0.21 K/UL (ref 0–0.7)
ERYTHROCYTE [DISTWIDTH] IN BLOOD BY AUTOMATED COUNT: 4.39 M/UL (ref 3.8–5.4)
ERYTHROCYTE [DISTWIDTH] IN BLOOD: 14.5 % (ref 11.5–15.5)
GLOBULIN SER CALC-MCNC: 2.6 G/DL
GLUCOSE BLD-MCNC: 113 MG/DL (ref 70–110)
GLUCOSE BLD-MCNC: 114 MG/DL (ref 70–110)
GLUCOSE BLD-MCNC: 158 MG/DL (ref 70–110)
GLUCOSE BLD-MCNC: 98 MG/DL (ref 70–110)
GLUCOSE SERPL-MCNC: 122 MG/DL (ref 74–99)
HCT VFR BLD AUTO: 38.9 % (ref 34–46)
HGB BLD-MCNC: 12.7 GM/DL (ref 11.4–16)
LYMPHOCYTES # BLD MANUAL: 0.62 K/UL (ref 1–4.8)
MCH RBC QN AUTO: 28.9 PG (ref 25–35)
MCHC RBC AUTO-ENTMCNC: 32.7 G/DL (ref 31–37)
MCV RBC AUTO: 88.5 FL (ref 80–100)
MONOCYTES # BLD MANUAL: 0.62 K/UL (ref 0–1)
NEUTROPHILS NFR BLD MANUAL: 65 %
NEUTS SEG # BLD MANUAL: 2.67 K/UL (ref 1.3–7.7)
PLATELET # BLD AUTO: 112 K/UL (ref 150–450)
POTASSIUM SERPL-SCNC: 3.4 MMOL/L (ref 3.5–5.1)
PROT SERPL-MCNC: 5.8 G/DL (ref 6.3–8.2)
SODIUM SERPL-SCNC: 136 MMOL/L (ref 137–145)
WBC # BLD AUTO: 4.1 K/UL (ref 3.8–10.6)

## 2022-10-01 RX ADMIN — LEVOTHYROXINE SODIUM SCH MCG: 100 TABLET ORAL at 07:52

## 2022-10-01 RX ADMIN — HEPARIN SODIUM SCH UNIT: 5000 INJECTION INTRAVENOUS; SUBCUTANEOUS at 19:51

## 2022-10-01 RX ADMIN — ROPINIROLE SCH MG: 4 TABLET, FILM COATED ORAL at 19:51

## 2022-10-01 RX ADMIN — HEPARIN SODIUM SCH UNIT: 5000 INJECTION INTRAVENOUS; SUBCUTANEOUS at 07:52

## 2022-10-01 RX ADMIN — PANTOPRAZOLE SODIUM SCH MG: 40 INJECTION, POWDER, FOR SOLUTION INTRAVENOUS at 07:52

## 2022-10-01 RX ADMIN — KETOROLAC TROMETHAMINE PRN MG: 15 INJECTION, SOLUTION INTRAMUSCULAR; INTRAVENOUS at 16:48

## 2022-10-02 VITALS — SYSTOLIC BLOOD PRESSURE: 129 MMHG | HEART RATE: 68 BPM | DIASTOLIC BLOOD PRESSURE: 79 MMHG | RESPIRATION RATE: 18 BRPM

## 2022-10-02 LAB
ANION GAP SERPL CALC-SCNC: 10 MMOL/L (ref 10–18)
BUN SERPL-SCNC: 24.3 MG/DL (ref 9–27)
BUN/CREAT SERPL: 30.04 RATIO (ref 12–20)
CALCIUM SPEC-MCNC: 9.6 MG/DL (ref 8.7–10.3)
CHLORIDE SERPL-SCNC: 101 MMOL/L (ref 96–109)
CO2 SERPL-SCNC: 25.4 MMOL/L (ref 20–27.5)
GLUCOSE BLD-MCNC: 103 MG/DL (ref 70–110)
GLUCOSE SERPL-MCNC: 101 MG/DL (ref 70–110)
POTASSIUM SERPL-SCNC: 3.5 MMOL/L (ref 3.5–5.5)
SODIUM SERPL-SCNC: 137 MMOL/L (ref 135–145)

## 2022-10-02 RX ADMIN — PANTOPRAZOLE SODIUM SCH MG: 40 INJECTION, POWDER, FOR SOLUTION INTRAVENOUS at 08:35

## 2022-10-02 RX ADMIN — HEPARIN SODIUM SCH UNIT: 5000 INJECTION INTRAVENOUS; SUBCUTANEOUS at 08:35

## 2022-10-02 RX ADMIN — LEVOTHYROXINE SODIUM SCH MCG: 100 TABLET ORAL at 08:36

## 2022-10-02 NOTE — P.DS
Providers


Date of admission: 


09/29/22 13:46





Expected date of discharge: 10/02/22


Attending physician: 


Thompson Hoffman MD





Primary care physician: 


[facility] Course: 





 87-year-old female recently changed PCPs, with past medical history of chronic 

back pain, spinal fusion, diabetes mellitus, diabetic neuropathy 

,osteoarthritis, hypothyroidism and multiple other medical issues brought into 

the ER via EMS.  Patient reported she had wet the bed, gotten up to change, 

ambulated into the bathroom, felt a little dizzy and tripped over diapers, while

brushing her hair.  Reports urinary frequency, urgency.  Troponin negative.  

Recent echo 2 weeks ago at Dr. Hoffman's  office reported preserved LV function.

 Denies chest pain, palpitations or shortness of breath.  Reports she had taken 

both Norco and Ativan together-the first time in " awhile, taking this combo" 

for her back pain, fell back and hit her head. EMS discovered a bottle of 

Valium. Brain/C-spine reported negative.  Per ER record report significant other

reported patient appeared drowsy prior to the fall.  UA reporting large 

leukocytes, greater than 182 WBCs, nitrate negative, 29 and the sites esterase, 

few bacteria, culture pending.  Toxicology screen detected opiates, 

benzodiazepines, serum alcohol less than 10.  Renal function stable.  Afebrile, 

T-max 99, normal WBC.  Blood sugars controlled.  T bili 1.7, alk phos 163.





Syncope, collapse, suspect polysubstance abuse as well as acute UTI, culture 

pending.


Gait dysfunction, history of falls


Recent Cellulitis of Left lower extremity, improved.


Left leg pain and edema secondary to above


Acute kidney injury, secondary to dehydration, improved


Hypertension


Hyperlipidemia


Osteoarthritis


restless leg syndrome


Diet controlled diabetes


Chronic back pain, follows with Dr. Woodward,OA, recommend further follow-up o

utpatient


Anxiety, depression





Patient was treated with IV Rocephin and was later switched to oral Keflex; 

being discharged in a stable condition





Plan - Discharge Summary


Discharge Rx Participant: Yes


New Discharge Prescriptions: 


New


   Cephalexin [Keflex] 500 mg PO Q8HR 7 Days #21 cap





Continue


   Omega-3 Fatty Acids/Fish Oil [Fish Oil 1,000 mg Softgel] 1,000 mg PO BID


   Vitamin E (Dl,Tocopheryl Acet) [Vitamin E (400 Iu = 180 mg)] 400 unit PO BID


   Vitamin B Complex 1 cap PO BID


   rOPINIRole HCL [Requip] 6 mg PO HS


   Potassium Chloride ER [K-Dur 20] 40 meq PO DAILY


   Levothyroxine Sodium [Synthroid] 100 mcg PO W/BRKFST


   Furosemide [Lasix] 40 mg PO DAILY


   Ubidecarenone [Co Q-10] 200 mg PO DAILY


   Glucosamine HCl/Chondroitin Dumont [Glucosamine-Chondroitin Cap] 1 cap PO DAILY


   Cholecalciferol (Vitamin D3) [Vitamin D3 (125 MCG = 5,000 IU)] 125 mcg PO 

DAILY


   Adrenal Complex 1 cap PO BID


   Magnesium Oxide [Mead] 500 mg PO QID


   Garlic 1,000 mg PO BID


   Zinc 50 mg PO DAILY


   LORazepam [Ativan] 1 mg PO HS


   Colchicine 0.6 mg PO DAILY


   DULoxetine HCL [Cymbalta] 30 mg PO DAILY


   Gabapentin [Neurontin] 100 mg PO DAILY


   Naproxen Sodium [Aleve] 220 mg PO BID PRN


     PRN Reason: Pain


   Ibuprofen [Motrin Ib] 400 mg PO Q6H PRN


     PRN Reason: Pain


   Spironolactone [Aldactone] 12.5 mg PO DAILY


   Pantoprazole [Protonix] 40 mg PO DAILY


   Multivitamins, Thera [Multivitamin (formulary)] 1 tab PO DAILY


   Ascorbic Acid [Vitamin C] 500 mg PO DAILY


   Meloxicam [Mobic] 7.5 mg PO DAILY


   HYDROcodone/APAP 5-325MG [Norco 5-325] 1 tab PO Q8H PRN


     PRN Reason: Pain


   Mirabegron [Myrbetriq] 50 mg PO DAILY


   S-Adenosylmethionine Sul Tosyl [Rosalio-E] 200 mg PO DAILY


   Haim's Leg Cramps 1 tab PO DAILY PRN


     PRN Reason: Pain


Discharge Medication List





Omega-3 Fatty Acids/Fish Oil [Fish Oil 1,000 mg Softgel] 1,000 mg PO BID 

03/11/18 [History]


Vitamin B Complex 1 cap PO BID 03/11/18 [History]


Vitamin E (Dl,Tocopheryl Acet) [Vitamin E (400 Iu = 180 mg)] 400 unit PO BID 

03/11/18 [History]


Furosemide [Lasix] 40 mg PO DAILY 09/26/21 [History]


Glucosamine HCl/Chondroitin Dumont [Glucosamine-Chondroitin Cap] 1 cap PO DAILY 

09/26/21 [History]


Levothyroxine Sodium [Synthroid] 100 mcg PO W/BRKFST 09/26/21 [History]


Potassium Chloride ER [K-Dur 20] 40 meq PO DAILY 09/26/21 [History]


Spironolactone [Aldactone] 12.5 mg PO DAILY 09/26/21 [History]


Ubidecarenone [Co Q-10] 200 mg PO DAILY 09/26/21 [History]


rOPINIRole HCL [Requip] 6 mg PO HS 09/26/21 [History]


Adrenal Complex 1 cap PO BID 04/14/22 [History]


Ascorbic Acid [Vitamin C] 500 mg PO DAILY 04/14/22 [History]


Cholecalciferol (Vitamin D3) [Vitamin D3 (125 MCG = 5,000 IU)] 125 mcg PO DAILY 

04/14/22 [History]


Garlic 1,000 mg PO BID 04/14/22 [History]


HYDROcodone/APAP 5-325MG [Norco 5-325] 1 tab PO Q8H PRN 04/14/22 [History]


LORazepam [Ativan] 1 mg PO HS 04/14/22 [History]


Magnesium Oxide [Mead] 500 mg PO QID 04/14/22 [History]


Meloxicam [Mobic] 7.5 mg PO DAILY 04/14/22 [History]


Multivitamins, Thera [Multivitamin (formulary)] 1 tab PO DAILY 04/14/22 

[History]


Pantoprazole [Protonix] 40 mg PO DAILY 04/14/22 [History]


Zinc 50 mg PO DAILY 04/14/22 [History]


Colchicine 0.6 mg PO DAILY 09/29/22 [History]


DULoxetine HCL [Cymbalta] 30 mg PO DAILY 09/29/22 [History]


Gabapentin [Neurontin] 100 mg PO DAILY 09/29/22 [History]


Haim's Leg Cramps 1 tab PO DAILY PRN 09/29/22 [History]


Ibuprofen [Motrin Ib] 400 mg PO Q6H PRN 09/29/22 [History]


Mirabegron [Myrbetriq] 50 mg PO DAILY 09/29/22 [History]


Naproxen Sodium [Aleve] 220 mg PO BID PRN 09/29/22 [History]


S-Adenosylmethionine Sul Tosyl [Rosalio-E] 200 mg PO DAILY 09/29/22 [History]


Cephalexin [Keflex] 500 mg PO Q8HR 7 Days #21 cap 10/02/22 [Rx]








Follow up Appointment(s)/Referral(s): 


Charlene Rodriguez MD [Primary Care Provider] - 1-2 days (please call tomorrow to set 

up an appointment with primary care drVania  )


[facility] Visiting Nurse, [NON-STAFF] - 1-2 Days ([facility] will call you to schedule your 

in home nursing visits and aide visits. )


Patient Instructions/Handouts:  Urinary Tract Infection in Women (GEN), Syncope 

(GEN)


Discharge Disposition: HOME SELF-CARE

## 2022-10-02 NOTE — P.PN
Subjective


Progress Note Date: 10/01/22





 87-year-old female recently changed PCPs, with past medical history of chronic 

back pain, spinal fusion, diabetes mellitus, diabetic neuropathy 

,osteoarthritis, hypothyroidism and multiple other medical issues brought into 

the ER via EMS.  Patient reported she had wet the bed, gotten up to change, 

ambulated into the bathroom, felt a little dizzy and tripped over diapers, while

brushing her hair.  Reports urinary frequency, urgency.  Troponin negative.  

Recent echo 2 weeks ago at Dr. Hoffman's  office reported preserved LV function.

 Denies chest pain, palpitations or shortness of breath.  Reports she had taken 

both Norco and Ativan together-the first time in " awhile, taking this combo" 

for her back pain, fell back and hit her head. EMS discovered a bottle of 

Valium. Brain/C-spine reported negative.  Per ER record report significant other

reported patient appeared drowsy prior to the fall.  UA reporting large 

leukocytes, greater than 182 WBCs, nitrate negative, 29 and the sites esterase, 

few bacteria, culture pending.  Toxicology screen detected opiates, 

benzodiazepines, serum alcohol less than 10.  Renal function stable.  Afebrile, 

T-max 99, normal WBC.  Blood sugars controlled.  T bili 1.7, alk phos 163.





Objective





- Vital Signs


Vital signs: 


                                   Vital Signs











Temp  98.3 F   10/01/22 03:20


 


Pulse  72   10/01/22 08:00


 


Resp  16   10/01/22 08:00


 


BP  112/57   10/01/22 03:20


 


Pulse Ox  92 L  10/01/22 03:20


 


FiO2      








                                 Intake & Output











 09/30/22 10/01/22 10/01/22





 18:59 06:59 18:59


 


Other:   


 


  Voiding Method Bedside Commode Bedside Commode Bedside Commode





 Diaper Diaper Diaper





 Incontinent Incontinent Incontinent


 


  # Voids 1 2 














- Exam





General:Patient is sitting up in bed comfortably, NAD,  


HEENT: Normocephalic. Neck is supple. Pupils reactive. MMM.


Neck: Supple, no JVD, carotid bruits, or thyromegaly. 


CHEST EXAMINATION: Trachea is central. Symmetrical expansion. Normal S1, S2 with

no gallops. No murmurs 


Respiratory: Nonlabored, CTA with diminished bilateral bases, no  rhonchi, no 

wheezing noted. 


ABDOMEN: Soft.  Nontender, Bowel sounds normal.  No guarding, no rigidity ,No 

organomegaly. No abdominal bruits. 


Extremities: left lower extremity with mnimal redness noted-prior cellulitis, 

+DP, No clubbing or cyanosis


Neurologically awake, alert, oriented x3 with well-coordinated movements.  No 

focal deficits noted


Skin: Warm and dry, no rashes noted


Psychiatric: Alert and reassess 3, mood and affect normal








- Labs


CBC & Chem 7: 


                                 10/01/22 12:25





                                 10/02/22 06:07


Labs: 


                  Abnormal Lab Results - Last 24 Hours (Table)











  09/30/22 09/30/22 10/01/22 Range/Units





  17:43 20:55 07:53 


 


POC Glucose (mg/dL)  116 H  133 H  113 H  ()  mg/dL








                      Microbiology - Last 24 Hours (Table)











 09/29/22 11:57 Urine Culture - Preliminary





 Urine,Voided    Gram Neg Bacilli














Assessment and Plan


Assessment: 





Syncope, collapse, suspect polysubstance abuse as well as acute UTI, culture 

pending.


Gait dysfunction, history of falls


Recent Cellulitis of Left lower extremity, improved.


Left leg pain and edema secondary to above


Acute kidney injury, secondary to dehydration, improved


Hypertension


Hyperlipidemia


Osteoarthritis


restless leg syndrome


Diet controlled diabetes


Chronic back pain, follows with Dr. Woodward,OA, recommend further follow-up 

outpatient


Anxiety, depression

















Plan: Continue on current medication regime ,monitoring and symptomatic 

treatment.  Maintain IV antibiotics, urine culture pending.  Orthostatic vitals 

ordered Discussed dangers of self-medicating/combining benzos with opioids. We 

will discontinue Ativan-discussed initiating Lexapro or BuSpar at [place].  PT/OT 

consulted.  PPI for GI prophylaxis.  Heparin subcu for DVT prophylaxis.

## 2022-11-09 ENCOUNTER — HOSPITAL ENCOUNTER (OUTPATIENT)
Dept: HOSPITAL 47 - PNWHC3 | Age: 87
End: 2022-11-09
Attending: ANESTHESIOLOGY
Payer: MEDICARE

## 2022-11-09 VITALS
RESPIRATION RATE: 18 BRPM | HEART RATE: 80 BPM | DIASTOLIC BLOOD PRESSURE: 78 MMHG | TEMPERATURE: 98.2 F | SYSTOLIC BLOOD PRESSURE: 129 MMHG

## 2022-11-09 DIAGNOSIS — K21.9: ICD-10-CM

## 2022-11-09 DIAGNOSIS — Z88.1: ICD-10-CM

## 2022-11-09 DIAGNOSIS — M96.1: ICD-10-CM

## 2022-11-09 DIAGNOSIS — M19.90: ICD-10-CM

## 2022-11-09 DIAGNOSIS — Z88.8: ICD-10-CM

## 2022-11-09 DIAGNOSIS — M51.36: ICD-10-CM

## 2022-11-09 DIAGNOSIS — Z88.5: ICD-10-CM

## 2022-11-09 DIAGNOSIS — M47.816: Primary | ICD-10-CM

## 2022-11-09 DIAGNOSIS — E11.9: ICD-10-CM

## 2022-11-09 DIAGNOSIS — Z79.890: ICD-10-CM

## 2022-11-09 PROCEDURE — 99211 OFF/OP EST MAY X REQ PHY/QHP: CPT

## 2022-11-09 NOTE — P.PAINPG
PQRS Measure Charge Sheet


Comment: 





HISTORY OF PRESENT ILLNESS:


87 yr old senior housing resident female w  at side as a referral from Dr Woodward presents today with severe and chronic mid to lower back pain x 5 mo 

secondary to DDD, spondylosis and facet arthropathy without myelopathy 

evaluation.  Patient states her pain level is at  9/10 in intensity, constant, 

localized in the mid to lower lumbar spine, front of thigh, sharp in character 

with radiation to the BL anterior thighs. Pain is provoked by walking/ standing 

for periods of 3 min or more. Pain is alleviated by chiropractic treatments 1-3 

times per mo but stopped due to ineffectiveness, meds (Norco, Ibuprofen), laying

on her R side, inactivity and rest. PT is too painful to try.





PMH: Diabetes Mellitus, GERD/ PUD, OA, Hypothyroidism, PseudoGout, RSL


PSH: C section x3, Hysterectomy, Tonsillectomy, D&C, Lithotripsy x2, 

Tonsillectomy x2, BL Cataract Resection, BL Knee Replacements, Parathyroid 

surgery, Lumbar Fusion, LLE Vein Stripping


SH: Negative x 3.


FH: Fa- Colon CA. Bro- Colon CA.


All: See list


Meds: See list





REVIEW OF ORGAN SYSTEMS:


                     CONSTITUTIONAL:  No fevers or chills. No recent weight 

loss.


                     NEUROLOGICAL: +  numbness and tingling along the distal 

extremities. No seizure disorders or headaches.


                     MUSCULOSKELETAL: + pain 


                     PSYCHIATRIC:  Denies current depression or suicidal 

thoughts.


    


Physical Examinations  :


                Constitutional : Cooperative , not in acute distress .          

                                                                                

                                                                                

                                                                                

                                                                                

     


                Neurologic :   Cranial nerve II   to  XII  intact. No focal 

neurological deficits.


                Psychiatric : alert & oriented  x 3. Matching mood & appropriate

affect. Judgment & insight intact. 


                Musculoskeletal :     


                               Cervical Spine 


                                         Motor strength in the deltoid and 

biceps: Normal  right side. Normal  Left side


                                         Motor strength biceps and the wrist 

extensors:   Normal right side . Normal left side 


                                         Motor strength in the triceps muscle: 

Normal right side.  Normal  left side  


                                         Deep tendon reflexes:  Normal at the 

biceps. Normal at Brachioradialis. Normal at triceps


                                         Vertebral body tenderness to deep 

palpation over 


                                         Cervical facet loading test: positive 

bilaterally


                                         Spurling test: positive bilaterally


                                         Neck distraction test: positive 

bilaterally


                                         Anthony sign: positive bilaterally


                                  Lumbar spine


                                         Motor strength lower extremities ,thigh

and legs  5/5 Right side ,  5/5  Left side 


                                         Deep tendon reflexes :   Normal  Knee 

Jerk. Normal   Ankle Jerk  


                                         Vertebral body tenderness over L3, L4


                                         Lumbar facet Loading Test: positive 

Right  / positive Left  


                                         Range of motion of the lumbar spine  

Flexion  30 degrees,   extension   10 degrees


                                         Straight Leg Raise test: Left/ Right 

positive at   degree   


                                         Daysi test: positive right /  positive 

left.


                                         Severe tenderness over the Sacroiliac 

joint  on the Right / Left  sides   


                                         Gaenslen  test: positive bilaterally


                                         Seated flexion test: positive 

bilaterally.


                                 Sacral spine :


                                         Severe tenderness over the Sacroiliac 

joint:  right side / left side 


                                         Range of motion: Flexion of the lumbar 

spine <60 degrees


                                         Range of motion: Extension of the 

lumbar spine <20 degrees


                                         Gaenslen's Test positive 


                                         Sloan's Test positive 


                                         Daysi test: positive  right side /  

left side


                                         Thigh Thrust Test


                                         Sacral Thrust Test


Imaging:


MRI without contrast of the lumbar spine from 9/20/22 reviewed





Assessment/ Plan : 


Lumbar DDD, lumbar spondylosis, lumbar disc bulge, post laminectomy syndrome


Recommendation of BL TFESI L3-L4. May need a series of injections, up to 3 

within a 6 mo period, for optimal pain relief. Risks, benefits of procedure 

discussed and patient verbalized understanding. Admits to aspirin or anti- 

coagulant use or medical history of diabetes. Protocol for discontinuation/ 

continuation of medications sachi procedure discussed.


All questions answered.


I have spent greater than 30 minutes on patient care today. Dr Prieto was 

available by phone for the evaluation of this patient. The time was used to 

review the medical records including relevant urine studies and Prescription 

history (MAPs), review of the available imaging, evaluation and examination of 

the patient, coordination of care with the medical staff and if applicable 

referring physicians, as well as creation of the medical record








- Pain Location


  ** Bilateral Lower Back


Non-Pharmacological Interventions: Chiropractic Treatment, Heat, Inactivity, 

Position/Reposition


Pharmacological Interventions: Medication, Scheduled Medication


PQRS Narrative: 


                                        





Smoking Status                   Never smoker








Home Medications: 


Ambulatory Orders





Omega-3 Fatty Acids/Fish Oil [Fish Oil 1,000 mg Softgel] 1,000 mg PO BID 

03/11/18 


Vitamin B Complex 1 cap PO BID 03/11/18 


Vitamin E (Dl,Tocopheryl Acet) [Vitamin E (400 Iu = 180 mg)] 400 unit PO BID 

03/11/18 


Furosemide [Lasix] 40 mg PO DAILY 09/26/21 


Glucosamine HCl/Chondroitin Dumont [Glucosamine-Chondroitin Cap] 1 cap PO DAILY 

09/26/21 


Levothyroxine Sodium [Synthroid] 100 mcg PO W/BRKFST 09/26/21 


Potassium Chloride ER [K-Dur 20] 40 meq PO DAILY 09/26/21 


Spironolactone [Aldactone] 12.5 mg PO DAILY 09/26/21 


Ubidecarenone [Co Q-10] 200 mg PO DAILY 09/26/21 


rOPINIRole HCL [Requip] 6 mg PO HS 09/26/21 


Adrenal Complex 1 cap PO BID 04/14/22 


Ascorbic Acid [Vitamin C] 500 mg PO DAILY 04/14/22 


Cholecalciferol (Vitamin D3) [Vitamin D3 (125 MCG = 5,000 IU)] 125 mcg PO DAILY 

04/14/22 


Garlic 1,000 mg PO BID 04/14/22 


HYDROcodone/APAP 5-325MG [Norco 5-325] 1 tab PO Q8H PRN 04/14/22 


LORazepam [Ativan] 1 mg PO HS 04/14/22 


Magnesium Oxide [Mead] 500 mg PO QID 04/14/22 


Meloxicam [Mobic] 7.5 mg PO DAILY 04/14/22 


Multivitamins, Thera [Multivitamin (formulary)] 1 tab PO DAILY 04/14/22 


Pantoprazole [Protonix] 40 mg PO DAILY 04/14/22 


Zinc 50 mg PO DAILY 04/14/22 


Colchicine 0.6 mg PO DAILY 09/29/22 


DULoxetine HCL [Cymbalta] 30 mg PO DAILY 09/29/22 


Gabapentin [Neurontin] 100 mg PO DAILY 09/29/22 


Haim's Leg Cramps 1 tab PO DAILY PRN 09/29/22 


Ibuprofen [Motrin Ib] 400 mg PO Q6H PRN 09/29/22 


Mirabegron [Myrbetriq] 50 mg PO DAILY 09/29/22 


Naproxen Sodium [Aleve] 220 mg PO BID PRN 09/29/22 


S-Adenosylmethionine Sul Tosyl [Rosalio-E] 200 mg PO DAILY 09/29/22 


Cephalexin [Keflex] 500 mg PO Q8HR 7 Days #21 cap 10/02/22 











Controlled Substance Measures





- Controlled Substance Measures


Is patient prescribed a controlled substance at discharge?: No

## 2022-11-29 ENCOUNTER — HOSPITAL ENCOUNTER (OUTPATIENT)
Dept: HOSPITAL 47 - ORPAIN | Age: 87
Discharge: HOME | End: 2022-11-29
Attending: ANESTHESIOLOGY
Payer: MEDICARE

## 2022-11-29 VITALS — HEART RATE: 99 BPM

## 2022-11-29 VITALS — TEMPERATURE: 97.4 F | RESPIRATION RATE: 16 BRPM

## 2022-11-29 VITALS — SYSTOLIC BLOOD PRESSURE: 123 MMHG | DIASTOLIC BLOOD PRESSURE: 93 MMHG

## 2022-11-29 DIAGNOSIS — Z88.5: ICD-10-CM

## 2022-11-29 DIAGNOSIS — M12.88: ICD-10-CM

## 2022-11-29 DIAGNOSIS — M47.26: Primary | ICD-10-CM

## 2022-11-29 DIAGNOSIS — Z88.8: ICD-10-CM

## 2022-11-29 DIAGNOSIS — M96.1: ICD-10-CM

## 2022-11-29 PROCEDURE — 99152 MOD SED SAME PHYS/QHP 5/>YRS: CPT

## 2022-11-29 PROCEDURE — 64483 NJX AA&/STRD TFRM EPI L/S 1: CPT

## 2022-11-29 NOTE — P.PCN
Date of Procedure: 11/29/22


Procedure(s) Performed: 








PREOPERATIVE DIAGNOSIS: 1-Lumbar radiculopathy . 2-lumbar postlaminectomy pain 

syndrome  3-lumbar spondylosis with lumbar facet arthropathy





POSTOPERATIVE DIAGNOSIS: Same as preoperative diagnoses.





PROCEDURE


1. Transforaminal epidural steroid injection under fluoroscopic guidance at 

bilateral L3-4 level.  (Fluoroscopy images stored on file in the radiology 

Department )


2. Lumbar epidurogram .





ANESTHESIA: Local with 1% lidocaine 3 ml , moderate sedation with intravenous 

Versed 1 mg  and fentanyle 50 micrograms.


                      Sedation start time : 1429                   .     

Sedation. stop time : 1443           .





EBL: Minimal


PROCEDURE INDICATION: The patient with low back pain and radiculopathy symptoms 

unresponsive to conservative treatment.


PROCEDURE DESCRIPTION / TECHNIQUE: 


  The patient was seen and identified in the preoperative area. Risks, benefits,

complications, and alternatives were discussed with the patient. The patient 

agreed to proceed with the procedure and signed the consent. IV was started, and

vital signs were stable.


  Patient was taken to the OR and time out was completed. The patient was placed

 in the prone position on procedure table and a pillow was placed under the abdo

men to reduce lumbar lordosis. The  lumbosacral area was prepped  and draped in 

the usual sterile fashion. Critical pause was taken. Vital signs were closely 

monitored during the procedure. Conscious sedation was used during the procedure

to decrease patient s anxiety. 


Using oblique fluoroscopy, the chin of the `Regiy dog at  right L3-4  level 

was identified, and the skin and deeper tissues just below was localized with 1%

lidocaine. Subsequently, a 22-gauge 5-inch spinal needle was advanced under a 

tunneled view fluoroscopic guidance just underneath the chin of the `Regiy dog

 at the right L3-4   Under lateral fluoroscopy, the needle was then advanced to

the posterior border of the  interforaminal  space. After negative aspiration of

CSF and blood and with no paresthesias, 1 mL Isovue  200 contrast dye was 

injected excellent epidurogram and outlining of the  nerve root Subsequently, 2 

mL of block solution containing 20 mg Depo-Medrol  and 2 mL of 0.9% normal 

saline PF  was injected. Needle was removed and the same procedure was repeated 

at the left L3-4   level . At the end of the procedure, skin was cleansed, and 

bandages were applied.


COMPLICATIONS:none 


DISPOSITION / PLANS: The patient was placed in a supine position and transferred

to the recovery area in a stable condition for observation. There was no 

evidence of lower extremity motor or sensory deficit after the procedure.  

Patient was discharged from the recovery room after meeting discharge criteria. 

Home discharge instructions were given to the patient by the staff. The patient 

was reexamined prior to discharge.

## 2022-11-29 NOTE — FL
Fluoroscopy

 

INDICATION: Pain

 

FINDINGS:

 

Fluoroscopy time: 12 seconds.

 

Images obtained: 2.

 

IMPRESSIONS:

1. Documentation of fluoroscopy.

## 2023-04-03 ENCOUNTER — HOSPITAL ENCOUNTER (EMERGENCY)
Dept: HOSPITAL 47 - EC | Age: 88
Discharge: HOME | End: 2023-04-03
Payer: MEDICARE

## 2023-04-03 VITALS
SYSTOLIC BLOOD PRESSURE: 130 MMHG | DIASTOLIC BLOOD PRESSURE: 72 MMHG | RESPIRATION RATE: 18 BRPM | HEART RATE: 67 BPM | TEMPERATURE: 97.8 F

## 2023-04-03 DIAGNOSIS — Z88.1: ICD-10-CM
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
## 2023-04-03 DIAGNOSIS — Z88.8: ICD-10-CM

## 2023-04-03 DIAGNOSIS — E11.9: ICD-10-CM

## 2023-04-03 DIAGNOSIS — Z88.5: ICD-10-CM

## 2023-04-03 DIAGNOSIS — E07.9: ICD-10-CM

## 2023-04-03 DIAGNOSIS — M19.90: ICD-10-CM

## 2023-04-03 DIAGNOSIS — Z79.890: ICD-10-CM

## 2023-04-03 DIAGNOSIS — S79.911A: Primary | ICD-10-CM

## 2023-04-03 DIAGNOSIS — Z79.899: ICD-10-CM

## 2023-04-03 DIAGNOSIS — Z79.01: ICD-10-CM

## 2023-04-03 DIAGNOSIS — K21.9: ICD-10-CM

## 2023-04-03 DIAGNOSIS — Z79.82: ICD-10-CM

## 2023-04-03 PROCEDURE — 73502 X-RAY EXAM HIP UNI 2-3 VIEWS: CPT

## 2023-04-03 PROCEDURE — 70450 CT HEAD/BRAIN W/O DYE: CPT

## 2023-04-03 PROCEDURE — 72125 CT NECK SPINE W/O DYE: CPT

## 2023-04-03 PROCEDURE — 99285 EMERGENCY DEPT VISIT HI MDM: CPT

## 2023-04-03 PROCEDURE — 72110 X-RAY EXAM L-2 SPINE 4/>VWS: CPT

## 2023-04-03 NOTE — CT
EXAMINATION TYPE: CT hip RT wo con

 

DATE OF EXAM: 4/3/2023

 

COMPARISON: Radiograph same day

 

HISTORY: 87-year-old female with pain after Fall out of bed

 

TECHNIQUE: Contiguous axial scanning of the right hip without IV contrast. Coronal and sagittal recon
structions performed. 3-D reconstructions generated on a dedicated independent workstation.

 

CT DLP: 827.3 mGycm

Automated exposure control for dose reduction was used.

 

 

FINDINGS: 

Osteopenia limiting the evaluation. Generalized anasarca change also present. There is underlying asc
ites fluid and sigmoid diverticulosis. Calcifications and thickening at the right hamstrings origin s
uggesting tendinopathy.

 

Moderate to severe degenerative change at the right hip.

 

There is some cortical buckling at the femoral head neck junction anteriorly and superiorly. Unable t
o clearly identify the fracture line due to the degree of osteopenia.

 

Osteitis pubis. No displaced fracture seen.

 

IMPRESSION: 

 

1. THE DEGREE OF OSTEOPENIA LIMITS THE EVALUATION. SUSPECT A SUBTLE, NONDISPLACED SUBCAPITAL FEMORAL 
NECK FRACTURE. MRI MAY BE NEEDED TO CONFIRM IF THE CLINICAL SCENARIO IS UNCLEAR.

2. MODERATE TO SEVERE RIGHT HIP OA.

3. GENERALIZED ANASARCA SUGGESTING FLUID OVERLOAD STATE OR CHF. THERE IS ALSO ABDOMINAL ASCITES. FURT
HER CLINICAL CORRELATION IS ADVISED.

## 2023-04-03 NOTE — ED
Fall HPI





- General


Chief Complaint: Back Pain/Injury


Stated Complaint: fall - back pain


Time Seen by Provider: 23 07:58


Source: patient, RN notes reviewed


Mode of arrival: EMS


Limitations: physical limitation





- History of Present Illness


Initial Comments: 


This is an 87-year-old female who presents to the emergency department for a 

fall.  Patient presents from [facility].  States that her bed was too low to the 

ground its wheels were not locked. When she went to get out of bed, she ended up

falling and when she landed she hit the back of her head and injured the right 

hip and right lower back.  The fall happened approximately 30 minutes prior to 

arrival.  Denies any loss of consciousness.  She is on Eliquis.





Denies any fevers, chills, sore throat, cough, dyspnea, chest pain, 

palpitations, abdominal pain, nausea, vomiting, or diarrhea.





MD Complaint: fall


Fall From: out of bed


Place Fall Occurred: nursing home/SNF


Loss of Consciousness: none


Location: head, back, pelvis


Context: tripped/slipped





- Related Data


                                Home Medications











 Medication  Instructions  Recorded  Confirmed


 


Levothyroxine Sodium [Synthroid] 100 mcg PO DAILY@0600 21


 


Famotidine 20 mg PO DAILY@0600 22


 


Apixaban [Eliquis] 5 mg PO BID 23


 


Aspirin 81 mg PO DAILY 23


 


Atorvastatin [Lipitor] 40 mg PO HS 23


 


Melatonin 3 mg PO HS PRN 23


 


Acetaminophen-Codeine 300-30mg 2 tab PO TID PRN 23





[Tylenol w/codeine #3]   


 


Lactobacillus Acidophilus 1 cap PO DAILY 23





[Acidophilus Probiotic]   


 


Calcium Carbonate [Tums] 500 mg PO DAILY PRN 23


 


Lactulose 30 gm PO QID 23


 


Pantoprazole [Protonix] 40 mg PO DAILY@0600 23


 


Potassium Chloride ER [K-Dur 10] 20 meq PO DAILY 23


 


Rifaximin [Xifaxan] 550 mg PO Q12H 23


 


Sertraline [Zoloft] 50 mg PO DAILY 23


 


levETIRAcetam [Keppra] 500 mg PO BID 23


 


rOPINIRole HCL [Ropinirole HCl] 2 mg PO TID@0900,1300,2100 23











                                    Allergies











Allergy/AdvReac Type Severity Reaction Status Date / Time


 


hydrochlorothiazide AdvReac  Nausea & Verified 23 09:50





   Vomiting  


 


levofloxacin [From Levaquin] AdvReac  n/v, Verified 23 09:50





   severe  





   muscle pain  


 


oxycodone HCl [From Percocet] AdvReac  Vomiting Verified 23 09:50


 


warfarin sodium AdvReac  GI BLEED Verified 23 09:50





[From Coumadin]     














Review of Systems


ROS Statement: 


Those systems with pertinent positive or pertinent negative responses have been 

documented in the HPI.





ROS Other: All systems not noted in ROS Statement are negative.





Past Medical History


Past Medical History: Diabetes Mellitus, GERD/Reflux, GI Bleed, Osteoarthritis 

(OA), Thyroid Disorder


Additional Past Medical History / Comment(s): hx of gall stone , hx kidney 

stones, PSEUDO GOUT ,PEPTIC ULCERS, varicose veins, restless leg syndrome, diet 

control diabetic,.  arthritis to back . recently fell at home admitted to 

hospital and then [facility].


History of Any Multi-Drug Resistant Organisms: None Reported


Past Surgical History: Back Surgery,  Section, Hysterectomy, Orthopedic 

Surgery, Tonsillectomy


Additional Past Surgical History / Comment(s): D&C, lithotripsy x2, 

tonsillectomy x 2, C/S x3, surgery to removed large stones from elizabeth kidneys, elizabeth

cataracts, elizabeth knee replacements( titanium and ceramic), parathyroid surgery, 

spinal fusion, vein strippiing left leg. colonoscopy


Past Anesthesia/Blood Transfusion Reactions: Motion Sickness


Additional Past Anesthesia/Blood Transfusion Reaction / Comment(s): blood 

tranfusions no issues


Past Psychological History: No Psychological Hx Reported


Smoking Status: Unknown if ever smoked


Past Alcohol Use History: None Reported


Past Drug Use History: None Reported





- Past Family History


  ** Brother(s)


Family Medical History: Cancer


Additional Family Medical History / Comment(s): colon





  ** Father


Family Medical History: Cancer


Additional Family Medical History / Comment(s): colon





General Exam


Limitations: physical limitation


General appearance: alert, in no apparent distress


Head exam: Present: atraumatic, normocephalic, normal inspection


Eye exam: Present: normal appearance, PERRL, EOMI.  Absent: scleral icterus, 

conjunctival injection, periorbital swelling


Respiratory exam: Present: normal lung sounds bilaterally.  Absent: respiratory 

distress, wheezes, rales, rhonchi, stridor


Cardiovascular Exam: Present: regular rate, normal rhythm, normal heart sounds. 

Absent: systolic murmur, diastolic murmur, rubs, gallop, clicks


Extremities exam: Present: other (Mild tenderness to palpation over the right 

lower back and right greater trochanter.)


Neurological exam: Present: alert, oriented X3, CN II-XII intact


Psychiatric exam: Present: normal affect, normal mood


Skin exam: Present: warm, dry, intact, normal color.  Absent: rash





Course


                                   Vital Signs











  23





  07:57 08:58 09:22


 


Temperature 97.6 F  


 


Pulse Rate 78 103 H 54 L


 


Respiratory 18 19 





Rate   


 


Blood Pressure 104/52 151/98 103/88


 


O2 Sat by Pulse 96 95 94 L





Oximetry   














  23





  10:20 11:55


 


Temperature  97.8 F


 


Pulse Rate 65 67


 


Respiratory  18





Rate  


 


Blood Pressure 90/65 130/72


 


O2 Sat by Pulse 97 96





Oximetry  














Medical Decision Making





- Medical Decision Making


This is an 87-year-old female who presents to the emergency department for a 

headache and back pain after a fall.





Was pt. sent in by a medical professional or institution?


@  -No   


Did you speak to anyone other than the patient for history?  


@  -No


Did you review nursing and triage notes? 


@  -Yes, and I agree, it is accurate with regards to the patient's symptoms.


Were old charts reviewed? 


@  -No


Differential Diagnosis? 


@  -Differential Back Injury:


Fracture, contusion, disc herniation, strain, this is not meant to be an all-

inclusive list. 


     -Differential Diagnosis Head Injury:


Contusion, hematoma, intracranial hemorrhage, skull fracture, whiplash, 

concussion, this is not meant to be an all-inclusive list.


X-rays interpreted by me (1pt min.)?


@  -X-ray of the right hip and lumbar spine obtained.  My interpretation of the 

lumbar spine x-ray reveals no acute fractures.  My interpretation of the right 

hip x-ray is difficult to fully interpret.


CT interpreted by me (1pt min.)?


@  -Computed tomography scan of the brain and c-spine obtained.  My 

interpretation identifies no evidence of an acute intracranial hemorrhage, skull

fracture, or cervical spine fracture.


What testing was considered but not performed? (CT, X-rays, U/S, labs)? Why?


@  -None


What meds were considered but not given? Why?


@  -None


Did you discuss the management of the patient with other professionals?


@  -Yes, Dr. Mcarthur, who cannot definitively determine if there is a fracture 

on the computed tomography scan, and an MRI would be needed.  Discussed that if 

the patient is able to move around on her own, she can be discharged and follow-

up outpatient.  However, if she is in too much pain, she can be admitted for an 

inpatient MRI.


Did you reconcile home meds?


@  -No


Was smoking cessation discussed for >3mins.?


@  -No


Was critical care preformed (if so, how long)?


@  -No


Were there social determinants of health that impacted care today? How? (Ho

melessness, low income, unemployed, alcoholism, drug addiction, transportation, 

low edu. Level, literacy, decrease access to med. care, nursing home, rehab)?


@  -No


Was there de-escalation of care discussed even if they declined? (Discuss DNR or

withdrawal of care, Hospice)?


@  -No


What co-morbidities impacted this encounter? (DM, HTN, Smoking, COPD, CAD, 

Cancer, CVA, Hep., AIDS, mental health diagnosis, sleep apnea, morbid obesity)?


@  -Osteoarthritis, morbid obesity


Was patient admitted / discharged?


@  -Discharged. We initially obtained x-rays of the lumbar spine and right hip. 

X-ray of the right hip could not rule out an occult subcapital fracture.  We 

subsequently obtained a computed tomography scan of the right hip.  Computed 

tomography scan revealed a possible nondisplaced left femoral neck fracture.  

Case discussed with Dr. Mcarthur, orthopedics.  He advised that an MRI would be 

needed to confirm this, and she can be discharged home if she is able to move 

around without too much discomfort.  She can also be admitted if ambulation is 

too difficult and she is unable to get around.  The patient was able to get 

herself up, pivot, and get to the bathroom without difficulty.  She does have a 

walker at [facility] as well. Discussed that she'll need to use her walker and only

have toe-touch weightbearing on the right side.  Patient expresses 

understanding.  She will follow up with orthopedics this week to discuss an MRI 

of the right hip.  Advised she continue taking her Tylenol #3 as needed for pain

relief.  We also discussed applying ice for 15-20 minutes every 2-3 hours.


Undiagnosed new problem with uncertain prognosis?


@  -None


Drug Therapy requiring intensive monitoring for toxicity (Heparin, Nitro, 

Insulin, Cardizem)?


@  -None


Were any procedures done?


@  -None


Diagnosis/symptom?


@  -Right hip injury/fracture, fall


Acute, or Chronic, or Acute on Chronic?


@  -Acute


Uncomplicated (without systemic symptoms) or Complicated (systemic symptoms)?


@  -Uncomplicated


Side effects of treatment?


@  -None


Exacerbation, Progression, or Severe Exacerbation]


@  -Not applicable


Poses a threat to life or bodily function?


@  -Yes, will limit her ability to ambulate. 





Return precautions reviewed in depth, the patient is instructed to return to the

emergency department with any new, worsening, or concerning symptoms. Patient 

verbalized understanding. 





This case was discussed in detail with the attending ED physician, Dr. Werner. 

Presentation, findings, and treatment plan discussed in detail as well. 








- Radiology Data


Radiology results: report reviewed, image reviewed





Disposition


Clinical Impression: 


 Injury of right hip





Disposition: HOME SELF-CARE


Instructions (If sedation given, give patient instructions):  Hip Fracture (ED)


Additional Instructions: 


Return to the emergency department with any new, worsening, or concerning 

symptoms.  Continue taking your Tylenol #3 as needed for pain.  Apply ice to the

right hip for 15-20 minutes every 2-3 hours.  You'll need to use your walker and

limit the amount of weight you put on the right leg.  Try not to touch more than

your toes to the ground when using the walker.  Contact orthopedics for a 

follow-up appointment, you will need to see them in the office this week.  


Is patient prescribed a controlled substance at d/c from ED?: No


Referrals: 


Charlene Rodriguez MD [Primary Care Provider] - 1-2 days

## 2023-04-03 NOTE — CT
EXAM EXAMINATION TYPE: CT brain kierra wo con

 

DATE OF EXAM: 4/3/2023

 

COMPARISON: 3/5/2023

 

HISTORY: 87-year-old female Fall out of bed

 

CT DLP: 1454.8 mGycm

Automated exposure control for dose reduction was used.

 

Technique:  Examination of the head was done in axial plane without intravenous contrast. Coronal and
 sagittal reconstructions performed.

 

CT of the cervical spine was obtained in axial plane without intravenous injection of  contrast mater
ial.  Coronal and sagittal reformatted images were obtained from the axial views for evaluation of  f
ractures, spinal alignment and canal.

 

 

FINDINGS:

 

Head:

Excessive motion limits the exam especially along the floor of the anterior cranial fossa.

 

There is no evidence of  acute intracranial hemorrhage, acute ischemic changes, mass, mass-effect, or
 extra-axial fluid collection.  There is no effacement of cerebral sulci or basal subarachnoid cister
ns.  There is no hydrocephalus.  There is no midline shift.  Gray-white matter distinction is preserv
ed.

 

Similar moderate generalized supratentorial volume loss and mild ventricular prominence. Confluent wh
ite matter hypodensities in both cerebral hemispheres remains unchanged.

 

Allowing for motion artifact, orbits and globes are grossly intact. Extensive opacification within th
e left mastoid air cells noted.

 

 

Cervical spine:

Limited by patient motion. No craniocervical junction abnormality or predental space widening. Degene
rative change at the C1 dens articulation.

 

There appears to be generalized anasarca change along with bilateral pleural effusions. Emphysematous
 change in the upper lungs. There seems to be some dependent groundglass also present.

 

Mild multilevel degenerative disc disease, more moderate at C5-C6. Again, limited by patient motion.

 

Trace grade 1 anterolisthesis C4-C5 is unchanged.

 

Prominent motion along the anterior margin of the C5 vertebral body.

 

Scattered facet and uncovertebral joint arthropathy. This continues to variable mild bilateral neural
 foraminal stenoses, more moderate on the left at C5-C6.

 

Sagittal and coronal reformatted images confirm above findings.

 

 

COMBINED IMPRESSION:

1. Similar moderate atrophy and confluent burden of chronic small vessel ischemic disease. Prominent 
motion especially limiting the floor of the anterior cranial fossa and orbits. No acute intracranial 
abnormality seen allowing for this limitation.

2. Extensive new fluid within the left mastoid air cells. Correlate for mastoid pain to exclude masto
iditis.

 

3. Motion limited assessment of the cervical spine. No definite acute fracture. Mild to moderate mult
ilevel spondylotic change.

4. Anasarca with bilateral pleural effusions. Underlying emphysema and suspected superimposed mild pu
lmonary vascular congestion. Clinically correlate.

## 2023-04-03 NOTE — XR
EXAMINATION TYPE: XR Hip Complete RT

 

DATE OF EXAM: 4/3/2023

 

COMPARISON: None

 

HISTORY: Fall, pain

 

TECHNIQUE: 2 view right hip

 

FINDINGS: Femoral head articulates with the acetabulum. Subcapital cortex is somewhat difficult to co
nfirm as intact. Consider CT for additional evaluation for occult subcapital fracture. Obvious fractu
re is not otherwise evident. Some degenerative changes are noted at symphysis pubis.

 

IMPRESSION:

1.  Clinical consideration for occult subcapital fracture, CT could be performed for better evaluatio
n.

## 2023-04-03 NOTE — XR
EXAMINATION TYPE: XR lumbosacral spine min 4V

 

DATE OF EXAM: 4/3/2023

 

COMPARISON: 5/8/2013

 

HISTORY: Low back pain right hip pain

 

TECHNIQUE: 5 view lumbar spine

 

FINDINGS: Pedicle screws and fixation rods are present at L3-S1 oblique views are limited. No obvious
 spondylolytic defects are evident. Vertebral body heights appear preserved.

 

There is mild grade 1 spondylolisthesis of L2 anteriorly on L3. There is posterior disc space narrowi
ng L3-4 through L5-S1. Disc space narrowing is present L2-3 mild disc space narrowing is present at L
1-L2

 

IMPRESSION:

1.  Degenerative disc changes discussed above.

2. Postsurgical changes.

3. Mild grade 1 spondylolisthesis of L2 anterior L3.

## 2023-04-15 ENCOUNTER — HOSPITAL ENCOUNTER (EMERGENCY)
Dept: HOSPITAL 47 - EC | Age: 88
Discharge: HOME | End: 2023-04-15
Payer: MEDICARE

## 2023-04-15 VITALS — RESPIRATION RATE: 16 BRPM | HEART RATE: 87 BPM | TEMPERATURE: 97.8 F

## 2023-04-15 VITALS — SYSTOLIC BLOOD PRESSURE: 121 MMHG | DIASTOLIC BLOOD PRESSURE: 66 MMHG

## 2023-04-15 DIAGNOSIS — Z79.899: ICD-10-CM

## 2023-04-15 DIAGNOSIS — M19.90: ICD-10-CM

## 2023-04-15 DIAGNOSIS — Z79.82: ICD-10-CM

## 2023-04-15 DIAGNOSIS — Z88.1: ICD-10-CM

## 2023-04-15 DIAGNOSIS — Z88.5: ICD-10-CM

## 2023-04-15 DIAGNOSIS — E11.36: ICD-10-CM

## 2023-04-15 DIAGNOSIS — Z88.8: ICD-10-CM

## 2023-04-15 DIAGNOSIS — Z04.3: Primary | ICD-10-CM

## 2023-04-15 DIAGNOSIS — Z79.01: ICD-10-CM

## 2023-04-15 PROCEDURE — 93005 ELECTROCARDIOGRAM TRACING: CPT

## 2023-04-15 PROCEDURE — 70450 CT HEAD/BRAIN W/O DYE: CPT

## 2023-04-15 PROCEDURE — 72125 CT NECK SPINE W/O DYE: CPT

## 2023-04-15 PROCEDURE — 99285 EMERGENCY DEPT VISIT HI MDM: CPT

## 2023-04-15 NOTE — ED
General Adult HPI





- General


Chief complaint: Fall


Stated complaint: Fall


Time Seen by Provider: 04/15/23 09:08


Source: patient, RN/MD, EMS, RN notes reviewed


Mode of arrival: EMS


Limitations: no limitations





- History of Present Illness


Initial comments: 





Patient is a pleasant 87-year-old female presenting to the emergency department 

for fall.  Patient is at nursing facility.  Patient was getting out of her bed 

to the wheelchair however was too far away for her and she did fall.  Patient 

does not believe she struck her head.  No neck or back pain.  Patient denies any

injury has no complaints.  Nursing question if patient may have bumped her head 

however patient denies this.  Patient is on blood thinners however she is 

unclear why.





- Related Data


                                Home Medications











 Medication  Instructions  Recorded  Confirmed


 


Levothyroxine Sodium [Synthroid] 100 mcg PO DAILY@0600 21


 


Famotidine 20 mg PO DAILY@0600 22


 


Apixaban [Eliquis] 5 mg PO BID 23


 


Aspirin 81 mg PO DAILY 23


 


Atorvastatin [Lipitor] 40 mg PO HS 23


 


Melatonin 3 mg PO HS PRN 23


 


Acetaminophen-Codeine 300-30mg 2 tab PO TID PRN 23





[Tylenol w/codeine #3]   


 


Lactobacillus Acidophilus 1 cap PO DAILY 23





[Acidophilus Probiotic]   


 


Calcium Carbonate [Tums] 500 mg PO DAILY PRN 23


 


Lactulose 30 gm PO QID 23


 


Pantoprazole [Protonix] 40 mg PO DAILY@0600 23


 


Potassium Chloride ER [K-Dur 10] 20 meq PO DAILY 23


 


Rifaximin [Xifaxan] 550 mg PO Q12H 23


 


Sertraline [Zoloft] 50 mg PO DAILY 23


 


levETIRAcetam [Keppra] 500 mg PO BID 04/03/23 04/03/23


 


rOPINIRole HCL [Ropinirole HCl] 2 mg PO TID@0900,1300,2100 23











                                    Allergies











Allergy/AdvReac Type Severity Reaction Status Date / Time


 


hydrochlorothiazide AdvReac  Nausea & Verified 04/15/23 09:03





   Vomiting  


 


levofloxacin [From Levaquin] AdvReac  n/v, Verified 04/15/23 09:03





   severe  





   muscle pain  


 


oxycodone HCl [From Percocet] AdvReac  Vomiting Verified 04/15/23 09:03


 


warfarin sodium AdvReac  GI BLEED Verified 04/15/23 09:03





[From Coumadin]     














Review of Systems


ROS Statement: 


Those systems with pertinent positive or pertinent negative responses have been 

documented in the HPI.





ROS Other: All systems not noted in ROS Statement are negative.


Constitutional: Denies: fever


Eyes: Denies: eye pain


ENT: Denies: ear pain


Respiratory: Denies: cough


Cardiovascular: Denies: chest pain


Endocrine: Denies: fatigue


Gastrointestinal: Denies: abdominal pain


Genitourinary: Denies: dysuria


Musculoskeletal: Denies: back pain


Skin: Denies: rash


Neurological: Denies: weakness





Past Medical History


Past Medical History: Diabetes Mellitus, GERD/Reflux, GI Bleed, Osteoarthritis 

(OA), Thyroid Disorder


Additional Past Medical History / Comment(s): hx of gall stone , hx kidney 

stones, PSEUDO GOUT ,PEPTIC ULCERS, varicose veins, restless leg syndrome, diet 

control diabetic,.  arthritis to back . recently fell at home admitted to 

hospital and [facility].


History of Any Multi-Drug Resistant Organisms: None Reported


Past Surgical History: Back Surgery,  Section, Hysterectomy, Orthopedic 

Surgery, Tonsillectomy


Additional Past Surgical History / Comment(s): D&C, lithotripsy x2, 

tonsillectomy x 2, C/S x3, surgery to removed large stones from elizabeth kidneys, elizabeth

cataracts, elizabeth knee replacements( titanium and ceramic), parathyroid surgery, 

spinal fusion, vein strippiing left leg. colonoscopy


Past Anesthesia/Blood Transfusion Reactions: Motion Sickness


Additional Past Anesthesia/Blood Transfusion Reaction / Comment(s): blood 

tranfusions no issues


Past Psychological History: No Psychological Hx Reported


Smoking Status: Unknown if ever smoked


Past Alcohol Use History: None Reported


Past Drug Use History: None Reported





- Past Family History


  ** Brother(s)


Family Medical History: Cancer


Additional Family Medical History / Comment(s): colon





  ** Father


Family Medical History: Cancer


Additional Family Medical History / Comment(s): colon





General Exam


Limitations: no limitations


General appearance: alert, in no apparent distress


Head exam: Present: atraumatic, normocephalic


Eye exam: Present: normal appearance, PERRL, EOMI


ENT exam: Present: normal oropharynx


Neck exam: Present: normal inspection.  Absent: tenderness


Respiratory exam: Present: normal lung sounds bilaterally


Cardiovascular Exam: Present: regular rate, normal rhythm


GI/Abdominal exam: Present: soft.  Absent: tenderness


Back exam: Present: normal inspection.  Absent: tenderness


Neurological exam: Present: alert, oriented X3, CN II-XII intact.  Absent: motor

sensory deficit


  ** Expanded


Neurological exam: Present: protecting the airway


Speech: Present: fluid speech


Cranial nerves: EOM's Intact: Normal


Motor strength exam: RUE: 5, LUE: 5, RLE: 5, LLE: 5


Eye Response: (4) open spontaneously


Motor Response: (6) obeys commands


Verbal Response: (5) oriented


Psychiatric exam: Present: normal affect, normal mood


Skin exam: Present: normal color





Course


                                   Vital Signs











  04/15/23





  09:00


 


Temperature 97.8 F


 


Pulse Rate 87


 


Respiratory 16





Rate 


 


Blood Pressure 118/72


 


O2 Sat by Pulse 96





Oximetry 














EKG Findings





- EKG Results:


EKG: interpreted by FORTINO (Right bundle-branch block), sinus rhythm, normal axis,

normal ST/T





Medical Decision Making





- Medical Decision Making





Was pt. sent in by a medical professional or institution (, PA, NP, urgent 

care, hospital, or nursing home...) When possible be specific


@  -Patient was sent from nursing home


Did you speak to anyone other than the patient for history (EMS, parent, family,

police, friend...)? What history was obtained from this source 


@  -No


Did you review nursing and triage notes (agree or disagree)?  Why? 


@  -I reviewed and agree with nursing and triage notes


Were old charts reviewed (outside hosp., previous admission, EMS record, old 

EKG, old radiological studies, urgent care reports/EKG's, nursing home records)?

Report findings 


@  -No old charts were reviewed


Differential Diagnosis (chest pain, altered mental status, abdominal pain women,

abdominal pain men, vaginal bleeding, weakness, fever, dyspnea, syncope, 

headache, dizziness, GI bleed, back pain, seizure, CVA, palpatations, mental 

health)? 


@  -not applicable


EKG interpreted by me (3pts min.).


@  -As above


X-rays interpreted by me (1pt min.).


@  -None done


CT interpreted by me (1pt min.).


@  -Report reviewed


U/S interpreted by me (1pt. min.).


@  -None done


What testing was considered but not performed or refused? (CT, X-rays, U/S, 

labs)? Why?


@  -None


What meds were considered but not given or refused? Why?


@  -None


Did you discuss the management of the patient with other professionals 

(professionals i.e. , PA, NP, lab, RT, psych nurse, , , 

teacher, , )? Give summary


@  -No


Was smoking cessation discussed for >3mins.?


@  -No


Was critical care preformed (if so, how long)?


@  -No


Were there social determinants of health that impacted care today? How? 

(Homelessness, low income, unemployed, alcoholism, drug addiction, 

transportation, low edu. Level, literacy, decrease access to med. care, [facility], 

rehab)?


@  -No


Was there de-escalation of care discussed even if they declined (Discuss DNR or 

withdrawal of care, Hospice)? DNR status


@  -No


What co-morbidities impacted this encounter? (DM, HTN, Smoking, COPD, CAD, 

Cancer, CVA, ARF, Chemo, Hep., AIDS, mental health diagnosis, sleep apnea, 

morbid obesity)?


@  -None


Was patient admitted / discharged? Hospital course, mention meds given and 

route, prescriptions, significant lab abnormalities, going to OR and other 

pertinent info.


@  -Patient reevaluated and remained symptom-free.  Patient updated on results 

and patient will be discharged back to nursing facility 


Undiagnosed new problem with uncertain prognosis?


@  -No


Drug Therapy requiring intensive monitoring for toxicity (Heparin, Nitro, 

Insulin, Cardizem)?


@  -No


Were any procedures done?


@  -No


Diagnosis/symptom?


@  -Fall


Acute, or Chronic, or Acute on Chronic?


@  -Acute


Uncomplicated (without systemic symptoms) or Complicated (systemic symptoms)?


@  -default


Side effects of treatment?


@  -No


Exacerbation, Progression, or Severe Exacerbation?


@  -No


Poses a threat to life or bodily function? How? (Chest pain, USA, MI, pneumonia,

PE, COPD, DKA, ARF, appy, cholecystitis, CVA, Diverticulitis, Homicidal, 

Suicidal, threat to staff... and all critical care pts)


@  -No





Disposition


Clinical Impression: 


 Fall





Disposition: HOME SELF-CARE


Condition: Stable


Instructions (If sedation given, give patient instructions):  Fall Prevention 

for Older Adults (ED)


Additional Instructions: 


Please do follow-up with primary care physician in the next couple days for 

recheck.  Return for weakness, falls, pain, change or worsening symptoms or 

other concerns.


Is patient prescribed a controlled substance at d/c from ED?: No


Referrals: 


Charlnee Rodriguez MD [Primary Care Provider] - 1-2 days


Time of Disposition: 10:36

## 2023-04-15 NOTE — CT
EXAMINATION TYPE: CT brain cspine wo con

CT DLP: 1457.1 mGycm, Automated exposure control for dose reduction was used.

 

DATE OF EXAM: 4/15/2023 9:39 AM

 

COMPARISON: CT brain C-spine 4/3/2023. 

 

CLINICAL INDICATION:Female, 87 years old with history of fall; pain after fall

 

TECHNIQUE: 

Brain: Multiple axial CT images of the brain were obtained without IV contrast. 

Cspine: Axial CT images from the skull base to the inferior aspect of T2 we obtained without intraven
ous contrast. Coronal and sagittal reformatted images were also reviewed. 

 

FINDINGS:

 

Brain:

Extra-axial spaces: No abnormal extra-axial fluid collections.

Ventricular system: Within normal limits

Cerebral parenchyma: Cerebral atrophy. No acute intraparenchymal hemorrhage or mass effect.  The gray
-white junction is well differentiated. Scattered hypoattenuating areas are seen within the white mat
ter.  

Cerebellum: Unremarkable.

Mass effect: No evidence of midline shift.

Intracranial vasculature: Atherosclerotic calcifications of the intracranial vessels.

Soft tissues: Normal.

Calvarium/osseous structures: No depressed skull fracture.

Paranasal sinuses and mastoid air cells: Mild scattered mucosal thickening and or secretions. Complet
e opacification of the left mastoid air cells again noted. Partial opacification of the right mastoid
 air cells.

Visualized orbits: Bilateral aphakia. Bilateral scleral calcifications.

 

Cervical spine:

Fracture: None.

Osseous structures: Mild degenerative disc disease at C5-C6 with disc space narrowing, endplate scler
osis, and anterior osteophytosis. 

Vertebral alignment: Similar grade 1 anterolisthesis of C4 on C5.

Spinal canal/Neural Foramina: No evidence of significant spinal canal narrowing. No evidence for sign
ificant neural foraminal stenosis.

Neck soft tissues: Prevertebral soft tissues are within normal limits.

Other: The airway is patent. Trace secretions within the visualized portion of the trachea. Partial v
isualization of left pleural effusion. 

 

IMPRESSION:

1.  No acute intracranial process.

2.  Nonspecific white matter changes, likely secondary to chronic small vessel ischemic disease.

3.  Complete opacification of the left mastoid air cells again and partial opacification of the right
 mastoid air cells. Correlate for mastoiditis.

4.  No evidence of cervical spine fracture.

5.  Mild multilevel degenerative disc disease.

6.  Partial visualization of left pleural effusion.

## 2024-05-04 NOTE — P.DS
Providers


Date of admission: 


04/15/22 11:45





Expected date of discharge: 04/19/22


Attending physician: 


Cristopher Awan





Consults: 





                                        





04/14/22 14:44


Consult Physician Urgent 


   Consulting Provider: John Isaac


   Consult Reason/Comments: Cellulitis


   Do you want consulting provider notified?: Yes











Primary care physician: 


Cristopher Awan





Hospital Course: 





Patient is a pleasant 86 year old female that presented to the emergency room 

with persistent leg redness and swelling. Patient has been treated for 

cellulitis twice outpatient without resolution. Patient has a pertinent medical 

history of diet controlled diabetes, osteoarthritis, hypothyroidism, kidney 

stones, restless leg syndrome, and varicose veins. Ultrasound of the lower 

extremities was negative for DVT. BUN was 24 and creatinine was 1.26, above 

patient's baseline. Infectious disease was consulted for cellulitis. Blood 

cultures were ordered. White blood cell count was normal. Repeat in the AM.


Hospitalist coverage 4/15/22-4/18/22 4/19/22


Patient was seen and evaluated at bedside. Patient was tearful, stating she 

wants to go home as her leg redness and swelling has not improved. She reports 

worse leg pain from when she was admitted due to laying in the hospital bed. 

Patient was advised to walk around more and try wearing ace wrap again to assist

with swelling. Will discuss course of treatment with infectious disease. 


Patient was cleared by infectious disease. Patient will be discharged with 

compression stockings and one week course of doxycycline.


Assessment: 


Cellulitis of Left lower extremity


Left leg pain and edema


Acute kidney injury, resolved


Hypertension


Hyperlipidemia


Osteoarthritis


restless leg syndrome


Diet controlled diabetes


Health Concerns: 


multiple comorbidities


Pertinent Studies: 





venous doppler- negative for DVT


Patient Condition at Discharge: Fair





Plan - Discharge Summary


New Discharge Prescriptions: 


New


   Doxycycline Hyclate 100 mg PO BID 7 Days #14 tab





Continue


   Omega-3 Fatty Acids/Fish Oil [Fish Oil 1,000 mg Softgel] 1,000 mg PO BID


   Vitamin E (Dl,Tocopheryl Acet) [Vitamin E (400 Iu = 180 mg)] 400 unit PO BID


   Vitamin B Complex 1 cap PO BID


   rOPINIRole HCL [Requip] 6 mg PO HS


   Potassium Chloride ER [K-Dur 20] 40 meq PO DAILY


   Levothyroxine Sodium [Synthroid] 100 mcg PO W/BRKFST


   Furosemide [Lasix] 40 mg PO DAILY


   Ubidecarenone [Co Q-10] 200 mg PO DAILY


   Glucosamine HCl/Chondroitin Dumont [Glucosamine-Chondroitin Cap] 1 cap PO BID


   Cholecalciferol (Vitamin D3) [Vitamin D3 (125 MCG = 5,000 IU)] 125 mcg PO 

DAILY


   Adrenal Complex 1 cap PO BID


   Magnesium Oxide [Mead] 500 mg PO QID


   Garlic 1,000 mg PO BID


   Zinc 50 mg PO DAILY


   LORazepam [Ativan] 1 mg PO HS


   Spironolactone [Aldactone] 12.5 mg PO DAILY


   Pantoprazole [Protonix] 40 mg PO DAILY


   Multivitamins, Thera [Multivitamin (formulary)] 1 tab PO DAILY


   Ascorbic Acid [Vitamin C] 500 mg PO BID


   Meloxicam [Mobic] 7.5 mg PO DAILY


   HYDROcodone/APAP 5-325MG [Norco 5-325] 1 tab PO Q6H PRN


     PRN Reason: Pain


Discharge Medication List





Omega-3 Fatty Acids/Fish Oil [Fish Oil 1,000 mg Softgel] 1,000 mg PO BID 

03/11/18 [History]


Vitamin B Complex 1 cap PO BID 03/11/18 [History]


Vitamin E (Dl,Tocopheryl Acet) [Vitamin E (400 Iu = 180 mg)] 400 unit PO BID 

03/11/18 [History]


Furosemide [Lasix] 40 mg PO DAILY 09/26/21 [History]


Glucosamine HCl/Chondroitin Dumont [Glucosamine-Chondroitin Cap] 1 cap PO BID 

09/26/21 [History]


Levothyroxine Sodium [Synthroid] 100 mcg PO W/BRKFST 09/26/21 [History]


Potassium Chloride ER [K-Dur 20] 40 meq PO DAILY 09/26/21 [History]


Spironolactone [Aldactone] 12.5 mg PO DAILY 09/26/21 [History]


Ubidecarenone [Co Q-10] 200 mg PO DAILY 09/26/21 [History]


rOPINIRole HCL [Requip] 6 mg PO HS 09/26/21 [History]


Adrenal Complex 1 cap PO BID 04/14/22 [History]


Ascorbic Acid [Vitamin C] 500 mg PO BID 04/14/22 [History]


Cholecalciferol (Vitamin D3) [Vitamin D3 (125 MCG = 5,000 IU)] 125 mcg PO DAILY 

04/14/22 [History]


Garlic 1,000 mg PO BID 04/14/22 [History]


HYDROcodone/APAP 5-325MG [Norco 5-325] 1 tab PO Q6H PRN 04/14/22 [History]


LORazepam [Ativan] 1 mg PO HS 04/14/22 [History]


Magnesium Oxide [Mead] 500 mg PO QID 04/14/22 [History]


Meloxicam [Mobic] 7.5 mg PO DAILY 04/14/22 [History]


Multivitamins, Thera [Multivitamin (formulary)] 1 tab PO DAILY 04/14/22 

[History]


Pantoprazole [Protonix] 40 mg PO DAILY 04/14/22 [History]


Zinc 50 mg PO DAILY 04/14/22 [History]


Doxycycline Hyclate 100 mg PO BID 7 Days #14 tab 04/19/22 [Rx]








Follow up Appointment(s)/Referral(s): 


Cristopher Awan MD [Primary Care Provider] - 1-2 days


[facility] Visiting Nurse, [NON-STAFF] - 1 Week


Discharge Disposition: HOME WITH HOME HEALTH SERVICES
Male